# Patient Record
Sex: MALE | Race: WHITE | NOT HISPANIC OR LATINO | Employment: FULL TIME | URBAN - METROPOLITAN AREA
[De-identification: names, ages, dates, MRNs, and addresses within clinical notes are randomized per-mention and may not be internally consistent; named-entity substitution may affect disease eponyms.]

---

## 2020-07-17 PROBLEM — Z00.00 WELL ADULT EXAM: Status: ACTIVE | Noted: 2020-07-17

## 2020-10-19 PROBLEM — Z00.00 WELL ADULT EXAM: Status: RESOLVED | Noted: 2020-07-17 | Resolved: 2020-10-19

## 2021-07-20 PROBLEM — Z00.00 ANNUAL PHYSICAL EXAM: Status: ACTIVE | Noted: 2021-07-20

## 2021-10-25 PROBLEM — Z00.00 ANNUAL PHYSICAL EXAM: Status: RESOLVED | Noted: 2021-07-20 | Resolved: 2021-10-25

## 2022-01-01 ENCOUNTER — PATIENT MESSAGE (OUTPATIENT)
Dept: ADMINISTRATIVE | Facility: OTHER | Age: 62
End: 2022-01-01
Payer: COMMERCIAL

## 2022-01-01 ENCOUNTER — LAB VISIT (OUTPATIENT)
Dept: PRIMARY CARE CLINIC | Facility: OTHER | Age: 62
End: 2022-01-01
Payer: COMMERCIAL

## 2022-01-01 DIAGNOSIS — Z20.822 ENCOUNTER FOR LABORATORY TESTING FOR COVID-19 VIRUS: ICD-10-CM

## 2022-01-01 PROCEDURE — U0003 INFECTIOUS AGENT DETECTION BY NUCLEIC ACID (DNA OR RNA); SEVERE ACUTE RESPIRATORY SYNDROME CORONAVIRUS 2 (SARS-COV-2) (CORONAVIRUS DISEASE [COVID-19]), AMPLIFIED PROBE TECHNIQUE, MAKING USE OF HIGH THROUGHPUT TECHNOLOGIES AS DESCRIBED BY CMS-2020-01-R: HCPCS | Performed by: FAMILY MEDICINE

## 2022-01-04 LAB
SARS-COV-2 RNA RESP QL NAA+PROBE: NOT DETECTED
SARS-COV-2- CYCLE NUMBER: NORMAL

## 2022-01-14 ENCOUNTER — LAB VISIT (OUTPATIENT)
Dept: PRIMARY CARE CLINIC | Facility: OTHER | Age: 62
End: 2022-01-14
Payer: COMMERCIAL

## 2022-01-14 DIAGNOSIS — Z20.822 ENCOUNTER FOR LABORATORY TESTING FOR COVID-19 VIRUS: ICD-10-CM

## 2022-01-14 PROCEDURE — U0003 INFECTIOUS AGENT DETECTION BY NUCLEIC ACID (DNA OR RNA); SEVERE ACUTE RESPIRATORY SYNDROME CORONAVIRUS 2 (SARS-COV-2) (CORONAVIRUS DISEASE [COVID-19]), AMPLIFIED PROBE TECHNIQUE, MAKING USE OF HIGH THROUGHPUT TECHNOLOGIES AS DESCRIBED BY CMS-2020-01-R: HCPCS | Performed by: FAMILY MEDICINE

## 2022-01-15 LAB
SARS-COV-2 RNA RESP QL NAA+PROBE: DETECTED
SARS-COV-2- CYCLE NUMBER: 17

## 2022-10-17 PROBLEM — Z00.00 ANNUAL PHYSICAL EXAM: Status: RESOLVED | Noted: 2021-07-20 | Resolved: 2022-10-17

## 2022-10-18 PROBLEM — Z12.5 SCREENING PSA (PROSTATE SPECIFIC ANTIGEN): Status: ACTIVE | Noted: 2022-10-18

## 2022-10-18 PROBLEM — Z79.899 ENCOUNTER FOR LONG-TERM (CURRENT) USE OF MEDICATIONS: Status: ACTIVE | Noted: 2022-10-18

## 2022-10-18 PROBLEM — E78.5 DYSLIPIDEMIA (HIGH LDL; LOW HDL): Status: ACTIVE | Noted: 2022-10-18

## 2022-10-18 PROBLEM — Z00.00 ANNUAL PHYSICAL EXAM: Status: ACTIVE | Noted: 2022-10-18

## 2023-01-23 ENCOUNTER — TELEPHONE (OUTPATIENT)
Dept: UROLOGY | Facility: CLINIC | Age: 63
End: 2023-01-23
Payer: COMMERCIAL

## 2023-01-23 ENCOUNTER — TELEPHONE (OUTPATIENT)
Dept: SURGERY | Facility: CLINIC | Age: 63
End: 2023-01-23
Payer: COMMERCIAL

## 2023-01-23 PROBLEM — Z00.00 ANNUAL PHYSICAL EXAM: Status: RESOLVED | Noted: 2022-10-18 | Resolved: 2023-01-23

## 2023-01-23 NOTE — TELEPHONE ENCOUNTER
----- Message from Mirtha Sánchez sent at 1/23/2023  8:42 AM CST -----  Contact: pt  Pt was informed that he needs to speak to someone in General Surgery about possible hernia pain. He would like a call back to discuss.     Confirmed contact below:  Contact Name:Akin Coombs  Phone Number: [phone]

## 2023-01-23 NOTE — TELEPHONE ENCOUNTER
Pt requested appointment for today because of pain. Contacted him to let him know he could be seen today at 2:30pm.    Pt informed me he was trying to come for a hernia that his PCP did not treat and that he was only treated for the UTI by his PCP. I informed him we do not treat hernias here and gave him the contact info to general surgery [facility]. [place]

## 2024-08-05 ENCOUNTER — OFFICE VISIT (OUTPATIENT)
Dept: PAIN MEDICINE | Facility: CLINIC | Age: 64
End: 2024-08-05

## 2024-08-05 ENCOUNTER — HOSPITAL ENCOUNTER (OUTPATIENT)
Dept: RADIOLOGY | Facility: HOSPITAL | Age: 64
Discharge: HOME | End: 2024-08-05
Attending: ANESTHESIOLOGY
Payer: COMMERCIAL

## 2024-08-05 VITALS
WEIGHT: 171.5 LBS | DIASTOLIC BLOOD PRESSURE: 86 MMHG | BODY MASS INDEX: 24.55 KG/M2 | HEIGHT: 70 IN | HEART RATE: 81 BPM | SYSTOLIC BLOOD PRESSURE: 124 MMHG

## 2024-08-05 DIAGNOSIS — M47.816 LUMBAR SPONDYLOSIS: ICD-10-CM

## 2024-08-05 DIAGNOSIS — M54.9 DORSALGIA, UNSPECIFIED: ICD-10-CM

## 2024-08-05 DIAGNOSIS — M54.9 DORSALGIA, UNSPECIFIED: Primary | ICD-10-CM

## 2024-08-05 DIAGNOSIS — M54.16 LUMBAR RADICULOPATHY: ICD-10-CM

## 2024-08-05 PROCEDURE — 72114 X-RAY EXAM L-S SPINE BENDING: CPT | Mod: TC,PO

## 2024-08-05 PROCEDURE — 72114 X-RAY EXAM L-S SPINE BENDING: CPT | Mod: 26,,, | Performed by: RADIOLOGY

## 2024-08-05 PROCEDURE — 99999 PR PBB SHADOW E&M-EST. PATIENT-LVL IV: CPT | Mod: PBBFAC,,, | Performed by: ANESTHESIOLOGY

## 2024-08-05 PROCEDURE — 99214 OFFICE O/P EST MOD 30 MIN: CPT | Mod: PBBFAC,25,PO | Performed by: ANESTHESIOLOGY

## 2024-08-05 RX ORDER — SILDENAFIL 100 MG/1
100 TABLET, FILM COATED ORAL
COMMUNITY
Start: 2024-08-01 | End: 2024-08-31

## 2024-08-05 RX ORDER — PROPAFENONE HYDROCHLORIDE 300 MG/1
1 TABLET, COATED ORAL 2 TIMES DAILY
COMMUNITY
Start: 2024-07-25 | End: 2025-07-25

## 2024-08-05 RX ORDER — TESTOSTERONE CYPIONATE 200 MG/ML
200 INJECTION, SOLUTION INTRAMUSCULAR
COMMUNITY
Start: 2024-08-01 | End: 2024-10-30

## 2024-08-05 RX ORDER — APIXABAN 2.5 MG/1
2.5 TABLET, FILM COATED ORAL 2 TIMES DAILY
COMMUNITY
Start: 2024-07-25

## 2024-08-20 ENCOUNTER — PATIENT MESSAGE (OUTPATIENT)
Dept: PAIN MEDICINE | Facility: CLINIC | Age: 64
End: 2024-08-20
Payer: COMMERCIAL

## 2024-08-20 ENCOUNTER — TELEPHONE (OUTPATIENT)
Dept: PAIN MEDICINE | Facility: CLINIC | Age: 64
End: 2024-08-20
Payer: COMMERCIAL

## 2024-08-23 ENCOUNTER — TELEPHONE (OUTPATIENT)
Dept: PAIN MEDICINE | Facility: CLINIC | Age: 64
End: 2024-08-23
Payer: COMMERCIAL

## 2024-08-23 DIAGNOSIS — M54.9 DORSALGIA, UNSPECIFIED: Primary | ICD-10-CM

## 2024-08-23 DIAGNOSIS — Z01.818 PRE-OP TESTING: ICD-10-CM

## 2024-08-23 NOTE — TELEPHONE ENCOUNTER
I spoke to Mr. Coombs and discussed his MRI results.  Can you please schedule him for another MRI, I have placed the order.      Once the MRI is done can you please help him get an appointment with Neurosurgery.  I have placed a referral    We can also schedule for lumbar JOHN    He will also need an updated CBC.  Order placed.    Physician - Dr Merino    Type of Procedure/Injection - Lumbar Epidural  L5/S1           Laterality - NA      Anxiolysis- RNIV      Need to hold medication - Yes      Eliquis for 3 days      Clearance needed - Yes      Follow up - 2 week

## 2024-08-26 ENCOUNTER — PATIENT MESSAGE (OUTPATIENT)
Dept: NEUROSURGERY | Facility: CLINIC | Age: 64
End: 2024-08-26
Payer: COMMERCIAL

## 2024-08-26 DIAGNOSIS — M54.16 LUMBAR RADICULOPATHY: Primary | ICD-10-CM

## 2024-08-26 RX ORDER — SODIUM CHLORIDE, SODIUM LACTATE, POTASSIUM CHLORIDE, CALCIUM CHLORIDE 600; 310; 30; 20 MG/100ML; MG/100ML; MG/100ML; MG/100ML
INJECTION, SOLUTION INTRAVENOUS CONTINUOUS
OUTPATIENT
Start: 2024-08-26

## 2024-08-26 NOTE — TELEPHONE ENCOUNTER
Spoke with patient and scheduled epidural injection an MRI lumbar w/wo contrast. Neurosurgery notified to schedule patient after MRI on 9/18. Patient will also obtain CBC prior to scheduled epidural injection. Per provider patient to hold Eliquis x 3 days prior. Office notified to fax Dr. Reyes for clearance. Pre op information given and follow up appointment scheduled.

## 2024-09-12 ENCOUNTER — TELEPHONE (OUTPATIENT)
Dept: SURGERY | Facility: HOSPITAL | Age: 64
End: 2024-09-12
Payer: COMMERCIAL

## 2024-09-12 NOTE — TELEPHONE ENCOUNTER
[facility] and ok to hold Eliquis x 3 days for lumber JOHN.

## 2024-09-17 ENCOUNTER — HOSPITAL ENCOUNTER (OUTPATIENT)
Facility: HOSPITAL | Age: 64
Discharge: HOME | End: 2024-09-17
Attending: ANESTHESIOLOGY | Admitting: ANESTHESIOLOGY
Payer: COMMERCIAL

## 2024-09-17 ENCOUNTER — HOSPITAL ENCOUNTER (OUTPATIENT)
Dept: RADIOLOGY | Facility: HOSPITAL | Age: 64
Discharge: HOME | End: 2024-09-17
Attending: ANESTHESIOLOGY | Admitting: ANESTHESIOLOGY
Payer: COMMERCIAL

## 2024-09-17 VITALS
DIASTOLIC BLOOD PRESSURE: 80 MMHG | TEMPERATURE: 97 F | HEART RATE: 65 BPM | OXYGEN SATURATION: 98 % | SYSTOLIC BLOOD PRESSURE: 113 MMHG | WEIGHT: 171 LBS | HEIGHT: 70 IN | BODY MASS INDEX: 24.48 KG/M2 | RESPIRATION RATE: 16 BRPM

## 2024-09-17 DIAGNOSIS — M54.50 LOWER BACK PAIN: ICD-10-CM

## 2024-09-17 DIAGNOSIS — M54.16 LUMBAR RADICULOPATHY: Primary | ICD-10-CM

## 2024-09-17 PROCEDURE — 25000003 PHARM REV CODE 250: Mod: PO | Performed by: ANESTHESIOLOGY

## 2024-09-17 PROCEDURE — 25500020 PHARM REV CODE 255: Mod: PO | Performed by: ANESTHESIOLOGY

## 2024-09-17 PROCEDURE — 62323 NJX INTERLAMINAR LMBR/SAC: CPT | Mod: PO | Performed by: ANESTHESIOLOGY

## 2024-09-17 PROCEDURE — 62323 NJX INTERLAMINAR LMBR/SAC: CPT | Mod: ,,, | Performed by: ANESTHESIOLOGY

## 2024-09-17 PROCEDURE — A4216 STERILE WATER/SALINE, 10 ML: HCPCS | Mod: PO | Performed by: ANESTHESIOLOGY

## 2024-09-17 PROCEDURE — 63600175 PHARM REV CODE 636 W HCPCS: Mod: PO | Performed by: ANESTHESIOLOGY

## 2024-09-17 RX ORDER — SODIUM CHLORIDE 9 MG/ML
INJECTION, SOLUTION INTRAMUSCULAR; INTRAVENOUS; SUBCUTANEOUS
Status: DISCONTINUED | OUTPATIENT
Start: 2024-09-17 | End: 2024-09-17 | Stop reason: HOSPADM

## 2024-09-17 RX ORDER — SODIUM CHLORIDE, SODIUM LACTATE, POTASSIUM CHLORIDE, CALCIUM CHLORIDE 600; 310; 30; 20 MG/100ML; MG/100ML; MG/100ML; MG/100ML
INJECTION, SOLUTION INTRAVENOUS CONTINUOUS
Status: DISCONTINUED | OUTPATIENT
Start: 2024-09-17 | End: 2024-09-17

## 2024-09-17 RX ORDER — METHYLPREDNISOLONE ACETATE 80 MG/ML
INJECTION, SUSPENSION INTRA-ARTICULAR; INTRALESIONAL; INTRAMUSCULAR; SOFT TISSUE
Status: DISCONTINUED | OUTPATIENT
Start: 2024-09-17 | End: 2024-09-17 | Stop reason: HOSPADM

## 2024-09-17 RX ORDER — ALPRAZOLAM 0.5 MG/1
1 TABLET, ORALLY DISINTEGRATING ORAL ONCE
Status: COMPLETED | OUTPATIENT
Start: 2024-09-17 | End: 2024-09-17

## 2024-09-17 RX ORDER — LIDOCAINE HYDROCHLORIDE 10 MG/ML
INJECTION, SOLUTION EPIDURAL; INFILTRATION; INTRACAUDAL; PERINEURAL
Status: DISCONTINUED | OUTPATIENT
Start: 2024-09-17 | End: 2024-09-17 | Stop reason: HOSPADM

## 2024-09-17 RX ADMIN — ALPRAZOLAM 1 MG: 0.5 TABLET, ORALLY DISINTEGRATING ORAL at 02:09

## 2024-09-17 NOTE — DISCHARGE INSTRUCTIONS

## 2024-09-17 NOTE — DISCHARGE SUMMARY
[facility]  Discharge Note  Short Stay    Admit Date: 9/17/2024    Discharge Date: 9/17/2024    Attending Physician: Macario Merino     Discharge Provider: Macario Merino    Diagnoses:  There are no hospital problems to display for this patient.      Discharged Condition: Good    Final Diagnoses: Lumbar radiculopathy [M54.16]    Disposition: Home or Self Care    Hospital Course: No complications, uneventful    Outcome of Hospitalization, Treatment, Procedure, or Surgery:  Patient was admitted for outpatient interventional pain management procedure. The patient tolerated the procedure well with no complications.    Follow up/Patient Instructions:  Follow up as scheduled in Pain Management office in 2-3 weeks.  Patient has received instructions and follow up date and time.    Medications:  Continue previous medications, except restart eliquis in 24 hours    Discharge Procedure Orders   Notify your health care provider if you experience any of the following:  temperature >100.4     Notify your health care provider if you experience any of the following:  persistent nausea and vomiting or diarrhea     Notify your health care provider if you experience any of the following:  severe uncontrolled pain     Notify your health care provider if you experience any of the following:  redness, tenderness, or signs of infection (pain, swelling, redness, odor or green/yellow discharge around incision site)     Notify your health care provider if you experience any of the following:  difficulty breathing or increased cough     Notify your health care provider if you experience any of the following:  severe persistent headache     Notify your health care provider if you experience any of the following:  worsening rash     Notify your health care provider if you experience any of the following:  persistent dizziness, light-headedness, or visual disturbances     Notify your health care provider if you experience any of the  following:  increased confusion or weakness     Activity as tolerated

## 2024-09-17 NOTE — H&P
[place] - Surgery  History & Physical - Short Stay  Pain Management       SUBJECTIVE:     Procedure: Procedure(s) (LRB):  Injection-steroid-epidural-lumbar   L5/S1 (N/A)    Chief Complaint/Reason for Admission:  Lumbar radiculopathy [M54.16]    PTA Medications   Medication Sig    ELIQUIS 2.5 mg Tab Take 2.5 mg by mouth 2 (two) times daily.    gabapentin (NEURONTIN) 300 MG capsule Take 1 capsule (300 mg total) by mouth 2 (two) times daily.    propafenone (RYTHMOL) 300 MG tablet Take 1 tablet by mouth 2 (two) times daily.    rosuvastatin (CRESTOR) 10 MG tablet rosuvastatin 10 mg tablet   Take 1 tablet every day by oral route at bedtime for 90 days.    sildenafiL (VIAGRA) 100 MG tablet Take 100 mg by mouth.       Review of patient's allergies indicates:   Allergen Reactions    No known drug allergies        Past Medical History:   Diagnosis Date    Facet arthritis of lumbar region     Hyperlipidemia     Pacemaker      Past Surgical History:   Procedure Laterality Date    CARDIAC PACEMAKER PLACEMENT      Aug. 2022    ROBOT-ASSISTED LAPAROSCOPIC REPAIR OF INGUINAL HERNIA USING DA MARIBEL XI Left 02/02/2023    Procedure: XI ROBOTIC REPAIR, HERNIA, INGUINAL;  Surgeon: Darwin Reyes MD;  Location: [facility];  Service: General;  Laterality: Left;     Family History   Problem Relation Name Age of Onset    Heart disease Father       Social History     Tobacco Use    Smoking status: Never    Smokeless tobacco: Never   Substance Use Topics    Alcohol use: Yes     Alcohol/week: 2.0 standard drinks of alcohol     Types: 2 Cans of beer per week     Comment: 1 beer a week    Drug use: Never        Current Facility-Administered Medications:     alprazolam ODT dissolvable tablet 1 mg, 1 mg, Oral, Once, Macario Merino MD    Review of Systems:  General ROS: negative for - fever  Dermatological ROS: negative for rash    OBJECTIVE:     Vital Signs (Most Recent):  Temp: 97 °F (36.1 °C) (09/17/24 1321)  Pulse: 66 (09/17/24 1321)  Resp: 18  (09/17/24 1321)  BP: 116/83 (09/17/24 1321)  SpO2: 99 % (09/17/24 1321)  Body mass index is 24.54 kg/m².    Physical Exam:  General appearance - alert, well appearing, and in no distress  Mental status - AOx3  Eyes - pupils equal and reactive, extraocular eye movements intact  Heart - normal rate, regular rhythm, normal S1, S2, no murmurs, rubs, clicks or gallops  Chest - clear to auscultation, no wheezes, rales or rhonchi, symmetric air entry  Abdomen - soft, nontender, nondistended, no masses or organomegaly  Neurological - alert, oriented, normal speech, no focal findings or movement disorder noted  Extremities - peripheral pulses normal, no pedal edema, no clubbing or cyanosis      ASSESSMENT/PLAN:     There are no hospital problems to display for this patient.     No changes since seen on 8/5/24.  MRI reviewed.  He has held eliquis appropriately. We will proceed with L5/S1 JOHN.    The risks and benefits of this intervention, and alternative therapies were discussed with the patient.  The discussion of risks included infection, bleeding, need for additional procedures or surgery, nerve damage, paralysis, adverse medication reaction(s), stroke, and/or death.  Questions regarding the procedure, risks, expected outcome, and possible side effects were solicited and answered to the patient's satisfaction.  Akin wishes to proceed with the injection.  Verbal and written consent were verified.  ASA 3, MP II      Proceed with intervention as scheduled.    Macario Merino M.D.  Interventional Pain Medicine / Anesthesiology

## 2024-09-17 NOTE — OP NOTE
"Procedure Note    Procedure Date: 9/17/2024    Procedure Performed:  L5/S1 lumbar interlaminar epidural steroid injection under fluoroscopy.    Indications:  Akin Coombs presents with lumbar radiculitis/radiculopathy secondary to disc herniation, osteophyte/osteophyte complexes, and/or severe degenerative disc disease producing foraminal or central spinal stenosis.  The pain has been present for at least 4 weeks and the patient has failed to respond to noninvasive conservative care.  Pain rated by NRS at baseline prior to intervention is 6/10.  Their radiculitis/radiculopathy and/or neurogenic claudication is severe enough to greatly impact their quality of life or function.     Pre-op diagnosis: Lumbar Radiculopathy    Post-op diagnosis: same    Physician: Macario Merino MD    IV anxiolysis medications: none    Medications injected: depomedrol 80mg, 1% Lidocaine 1ml, 2 mL sterile, preservative-free normal saline.    Local anesthetic used: 1% Lidocaine, 1 ml    Estimated Blood Loss: none    Complications:  none    Technique:  The patient was interviewed in the holding area and Risks/Benefits were discussed, including, but not limited to, the possibility of new or different pain, bleeding or infection.   All questions were answered.  The patient understood and accepted risks.  Consent was verfied.  A time-out was taken to identify patient and procedure prior to starting the procedure. The patient was placed in the prone position on the fluoroscopy table. The area of the lumbar spine was prepped with Chloraprep x2 and draped in a sterile manner. The L5/S1 interspace was identified and marked under AP fluoroscopy. The skin and subcutaneous tissues overlying the targeted interspace were anesthetized with 3-5 mL of 1% lidocaine using a 25G, 1.5" needle.  A 20G, 3.5" Tuohy epidural needle was directed toward the interspace under fluoroscopic guidance until the ligamentum flavum was engaged. From this point, a loss of " resistance technique with a glass syringe and saline was used to identify entrance of the needle into the epidural space. Once loss of resistance was observed 1 mL of contrast solution was injected. An appropriate epidurogram was noted.  A 4 mL mixture consisting of saline, 1 mL 1% Lidocaine and 80 mg of depomedrol was injected slowly and without resistance.  The needle was flushed with normal saline and removed. The contrast was seen to be displaced after injection. Patient was awake/responsive during all injections.  The patient tolerated the procedure well and was transferred to the [facility] in stable condition.  The patient was monitored after the procedure and was given post-procedure and discharge instructions to follow at home. The patient was discharged in a stable condition.

## 2024-09-19 ENCOUNTER — PATIENT MESSAGE (OUTPATIENT)
Dept: PAIN MEDICINE | Facility: CLINIC | Age: 64
End: 2024-09-19
Payer: COMMERCIAL

## 2024-09-19 ENCOUNTER — OFFICE VISIT (OUTPATIENT)
Dept: NEUROSURGERY | Facility: CLINIC | Age: 64
End: 2024-09-19
Payer: COMMERCIAL

## 2024-09-19 VITALS
SYSTOLIC BLOOD PRESSURE: 129 MMHG | RESPIRATION RATE: 18 BRPM | WEIGHT: 175.06 LBS | BODY MASS INDEX: 25.06 KG/M2 | HEIGHT: 70 IN | HEART RATE: 89 BPM | DIASTOLIC BLOOD PRESSURE: 85 MMHG

## 2024-09-19 DIAGNOSIS — M54.9 DORSALGIA, UNSPECIFIED: ICD-10-CM

## 2024-09-19 PROCEDURE — 3008F BODY MASS INDEX DOCD: CPT | Mod: CPTII,S$GLB,, | Performed by: STUDENT IN AN ORGANIZED HEALTH CARE EDUCATION/TRAINING PROGRAM

## 2024-09-19 PROCEDURE — 3079F DIAST BP 80-89 MM HG: CPT | Mod: CPTII,S$GLB,, | Performed by: STUDENT IN AN ORGANIZED HEALTH CARE EDUCATION/TRAINING PROGRAM

## 2024-09-19 PROCEDURE — 3074F SYST BP LT 130 MM HG: CPT | Mod: CPTII,S$GLB,, | Performed by: STUDENT IN AN ORGANIZED HEALTH CARE EDUCATION/TRAINING PROGRAM

## 2024-09-19 PROCEDURE — 99205 OFFICE O/P NEW HI 60 MIN: CPT | Mod: S$GLB,,, | Performed by: STUDENT IN AN ORGANIZED HEALTH CARE EDUCATION/TRAINING PROGRAM

## 2024-09-19 PROCEDURE — 1159F MED LIST DOCD IN RCRD: CPT | Mod: CPTII,S$GLB,, | Performed by: STUDENT IN AN ORGANIZED HEALTH CARE EDUCATION/TRAINING PROGRAM

## 2024-09-19 NOTE — PROGRESS NOTES
[facility] Neurosurgery - [facility]  Clinic Consult     Consult Requested By: Macario Merino MD  PCP: Goyo Rocha DO    SUBJECTIVE:     Chief Complaint:   Chief Complaint   Patient presents with    Lumbar Spine Pain (L-Spine)     Image review        History of Present Illness:  Akin Coombs is a 64 y.o. male with HLD, afib maintained on Eliquis, pacemaker status who presents for evaluation of right leg pain. Patient reports onset symptoms 6 years ago. It was initially present in the left leg. He reports he has managed his flares of radiculopathy conservatively over the years. He has attended PT. He did receive an injection at L5-S1 yesterday and is doing well today. Previous to this injection, he was experiencing pain in the right L5/S1 distribution. It is worse when seated. He is ok once he is moving. He does have back stiffness after prolonged sitting.     Pertinent and recent history, provider evaluations, imaging and data reviewed in EPIC        Past Medical History:   Diagnosis Date    Facet arthritis of lumbar region     Hyperlipidemia     Pacemaker      Past Surgical History:   Procedure Laterality Date    CARDIAC PACEMAKER PLACEMENT      Aug. 2022    EPIDURAL STEROID INJECTION INTO LUMBAR SPINE N/A 9/17/2024    Procedure: Injection-steroid-epidural-lumbar   L5/S1;  Surgeon: Macario Merino MD;  Location: [facility] OR;  Service: Pain Management;  Laterality: N/A;    ROBOT-ASSISTED LAPAROSCOPIC REPAIR OF INGUINAL HERNIA USING DA MARIBEL XI Left 02/02/2023    Procedure: XI ROBOTIC REPAIR, HERNIA, INGUINAL;  Surgeon: Darwin Reyes MD;  Location: [facility] OR;  Service: General;  Laterality: Left;     Family History   Problem Relation Name Age of Onset    Heart disease Father       Social History     Tobacco Use    Smoking status: Never    Smokeless tobacco: Never   Substance Use Topics    Alcohol use: Yes     Alcohol/week: 2.0 standard drinks of alcohol     Types: 2 Cans of beer per week      "Comment: 1 beer a week    Drug use: Never      Review of patient's allergies indicates:   Allergen Reactions    No known drug allergies        Current Outpatient Medications:     ELIQUIS 2.5 mg Tab, Take 2.5 mg by mouth 2 (two) times daily., Disp: , Rfl:     gabapentin (NEURONTIN) 300 MG capsule, Take 1 capsule (300 mg total) by mouth 2 (two) times daily., Disp: 60 capsule, Rfl: 0    propafenone (RYTHMOL) 300 MG tablet, Take 1 tablet by mouth 2 (two) times daily., Disp: , Rfl:     rosuvastatin (CRESTOR) 10 MG tablet, rosuvastatin 10 mg tablet  Take 1 tablet every day by oral route at bedtime for 90 days., Disp: , Rfl:     sildenafiL (VIAGRA) 100 MG tablet, Take 100 mg by mouth., Disp: , Rfl:     Review of Systems:   Constitutional: no fever, chills or night sweats. No changes in weight   Eyes: no visual changes   ENT: no nasal congestion or sore throat   Respiratory: no cough or shortness of breath   Cardiovascular: no chest pain or palpitations   Gastrointestinal: no nausea or vomiting   Genitourinary: no hematuria or dysuria   Integument/Breast: no rash or pruritis   Hematologic/Lymphatic: no easy bruising or lymphadenopathy   Musculoskeletal: +leg pain   Neurological: no seizures or tremors   Behavioral/Psych: no auditory or visual hallucinations   Endocrine: no heat or cold intolerance         OBJECTIVE:     Vital Signs (Most Recent):  Pulse: 89 (09/19/24 1356)  Resp: 18 (09/19/24 1356)  BP: 129/85 (09/19/24 1356)  Estimated body mass index is 25.12 kg/m² as calculated from the following:    Height as of this encounter: 5' 10" (1.778 m).    Weight as of this encounter: 79.4 kg (175 lb 0.7 oz).    Physical Exam:   General: well developed, well nourished, no distress   Neurologic: Alert and oriented. Thought content appropriate. GCS 15.   Head: normocephalic, atraumatic  Eyes: EOMI  Neck: trachea midline, no JVD   Cardiovascular: no LE edema  Pulmonary: normal respirations, no signs of respiratory " distress  Abdomen: non-distended  Sensory: intact to light touch throughout  Skin: Skin is warm, dry and intact    Motor Strength: Moves all extremities spontaneously with good tone. No abnormal movements seen.       Iliopsoas Quadriceps Knee  Flexion Tibialis  anterior Gastro- cnemius EHL   Lower: R 5/5 5/5 5/5 5/5 5/5 5/5    L 5/5 5/5 5/5 5/5 5/5 5/5     DTR's: 2 +  Clonus: absent  Gait: normal          Diagnostic Results:  I have independently reviewed the following imaging:  MRI lumbar spine  FINDINGS:  NOMENCLATURE: Five lumbar type vertebral bodies.     CORD/CAUDA EQUINA: Conus has normal size and signal and ends at a normal level of L1-L2.  Homogeneously enhancing 5 x 3 x 5 mm enhancing nodule in the left paramedian dorsal thecal sac at the T12 level (series 15, image 6, series 14, image 9), posterior to the conus.  This corresponds to the finding of concern on prior study from 08/21/2024.  No other lesions are identified.  Incidental small sacral Tarlov cysts are again noted.     ALIGNMENT: Trace retrolisthesis of L1 on L2.  4 mm grade 1 anterolisthesis of L4 on L5.     BONES: Chronic L1 vertebral body compression fracture with 70% height loss centrally.  No aggressive bone marrow signal.     PARASPINAL AREA: Normal.     LUMBAR DISC LEVELS:     T12-L1: No disc herniation or significant posterior osteophytic ridging. No significant spinal canal or foraminal stenosis.     L1-L2: Trace retrolisthesis.  Minimal disc bulge.  Mild bilateral facet hypertrophy.  No significant spinal canal or foraminal stenosis.     L2-L3: Minimal disc bulge.  Mild bilateral facet hypertrophy.  Minimal narrowing of the bilateral lateral recesses.  No significant spinal canal or foraminal stenosis.     L3-L4: Minimal disc bulge.  Mild bilateral facet hypertrophy.  Mild narrowing of the bilateral lateral recesses.  No significant spinal canal or foraminal stenosis.     L4-L5: Anterolisthesis.  Unroofing of disc mild disc bulge.   Marked left and moderate-marked right facet hypertrophy.  Ligamentum flavum thickening.  Mild-moderate narrowing of the bilateral lateral recesses.  Unchanged moderate spinal canal stenosis.  Mild bilateral foraminal stenosis.     L5-S1: Mild disc bulge.  Moderate-marked right and mild left facet hypertrophy.  Mild narrowing of the bilateral lateral recesses.  No significant spinal canal stenosis.  Minimal bilateral foraminal stenosis.       ASSESSMENT/PLAN:     Dorsalgia, unspecified  -     Ambulatory referral/consult to Neurosurgery        Akin Coombs is a 64 y.o. male  With mechanical back pain and radiculopathy  He has a lumbar imaged with multilevel facet arthropathy right side L5-S1, at L4-5 bilateral facet arthropathy lateral recess stenosis broad-based disc bulge no central stenosis moderate lateral recess.  Relatively healthy disc height.  There is a great targeted for surgery.  He does have lateral recess stenosis at L4-5.  We did discuss a potential laminectomy medial facetectomy, though we discussed the uncertainty of improvement of back pain which I think is multifactorial  I he has just dorsal to the conus what appears to be a likely schwannoma or nerve sheath tumor  He wants to continue to manage his degenerative stenosis conservatively  With regard to the nerve sheath tumor we discussed a surveillance MRI in 1 year        The diagnosis, goals, limitations, risks and benefits of surgery and alternative treatment options where discussed at length (pros/cons). All questions/concerns were addressed. The patient has verbalized a good understanding of the diagnosis, the potential procedure, anticipated post-operative course, overall expectations, and risks including but not limited to: spinal cord or nerve root injury/paralysis, death, nerve injury leading to pain or neurological deficit, csf leak, vascular injury or serious bleeding/need for blood product transfusion/stroke, wrong level surgery,  chronic pain/failure to improve or worsening of symptoms, infection, need for further surgery at the same or different levels; medical (eg Heart attack, blood clot, infection) and anesthetic complications.     Patient verbalized understanding of plan. Encouraged to call with any questions or concerns.     This note was partially dictated using voice recognition software, so please excuse any errors that were not corrected.

## 2024-09-23 NOTE — TELEPHONE ENCOUNTER
Yes, I had spoken with Dr. Llanos  and he said he would be willing to see you    The medication he received was a dissolvable form of alprazolam (xanex)

## 2024-09-30 ENCOUNTER — PATIENT MESSAGE (OUTPATIENT)
Dept: PAIN MEDICINE | Facility: CLINIC | Age: 64
End: 2024-09-30
Payer: COMMERCIAL

## 2024-10-02 ENCOUNTER — OFFICE VISIT (OUTPATIENT)
Dept: PAIN MEDICINE | Facility: CLINIC | Age: 64
End: 2024-10-02
Payer: COMMERCIAL

## 2024-10-02 VITALS
HEIGHT: 70 IN | HEART RATE: 86 BPM | WEIGHT: 168.88 LBS | SYSTOLIC BLOOD PRESSURE: 113 MMHG | DIASTOLIC BLOOD PRESSURE: 79 MMHG | BODY MASS INDEX: 24.18 KG/M2

## 2024-10-02 DIAGNOSIS — M48.061 SPINAL STENOSIS OF LUMBAR REGION, UNSPECIFIED WHETHER NEUROGENIC CLAUDICATION PRESENT: ICD-10-CM

## 2024-10-02 DIAGNOSIS — M54.16 LUMBAR RADICULOPATHY: Primary | ICD-10-CM

## 2024-10-02 PROCEDURE — 1159F MED LIST DOCD IN RCRD: CPT | Mod: CPTII,S$GLB,, | Performed by: ANESTHESIOLOGY

## 2024-10-02 PROCEDURE — 99999 PR PBB SHADOW E&M-EST. PATIENT-LVL III: CPT | Mod: PBBFAC,,, | Performed by: ANESTHESIOLOGY

## 2024-10-02 PROCEDURE — 3074F SYST BP LT 130 MM HG: CPT | Mod: CPTII,S$GLB,, | Performed by: ANESTHESIOLOGY

## 2024-10-02 PROCEDURE — 3078F DIAST BP <80 MM HG: CPT | Mod: CPTII,S$GLB,, | Performed by: ANESTHESIOLOGY

## 2024-10-02 PROCEDURE — 3008F BODY MASS INDEX DOCD: CPT | Mod: CPTII,S$GLB,, | Performed by: ANESTHESIOLOGY

## 2024-10-02 PROCEDURE — 99214 OFFICE O/P EST MOD 30 MIN: CPT | Mod: S$GLB,,, | Performed by: ANESTHESIOLOGY

## 2024-10-02 NOTE — PROGRESS NOTES
[facility] Follow Up Evaluation      Referred by: No ref. provider found    PCP:     CC:   Chief Complaint   Patient presents with    Low-back Pain          10/2/2024    11:53 AM 8/5/2024     3:05 PM   Last 3 PDI Scores   Pain Disability Index (PDI) 22 12       Interval HPI 10/2/24: Mr. Coombs returns to the office for follow up.  He is s/p L5/S1 JOHN on 9/17/24 with 50% relief his symptoms.  Today he rates his pain as 4/10.  He reports he can still have some back pain that interrupts his sleep at night.    HPI:   Akin Coombs is a 64 y.o. male patient who has a past medical history of Facet arthritis of lumbar region, Hyperlipidemia, and Pacemaker. He presents with back pain.  Has had chronic back pain for over the past 6 years.  Today he reports his pain ranges between 6-7/10, intermittent, aching, sharp, tight.  Has radiating pain into the left thigh.  Pain is worse with sitting, standing and doing yd work and bending and relieved with rest and lying down.      Pain Intervention History:  - s/p L5/S1 JOHN on 9/17/24    Past Spine Surgical History:      Past and current medications:  Antineuropathics: gabapentin   NSAIDs:  Physical therapy: yes, completed in the past  Antidepressants:  Muscle relaxers:  Opioids:  Antiplatelets/Anticoagulants: eliquis     History:    Current Outpatient Medications:     ELIQUIS 2.5 mg Tab, Take 2.5 mg by mouth 2 (two) times daily., Disp: , Rfl:     gabapentin (NEURONTIN) 300 MG capsule, Take 1 capsule (300 mg total) by mouth 2 (two) times daily., Disp: 60 capsule, Rfl: 0    propafenone (RYTHMOL) 300 MG tablet, Take 1 tablet by mouth 2 (two) times daily., Disp: , Rfl:     rosuvastatin (CRESTOR) 10 MG tablet, rosuvastatin 10 mg tablet  Take 1 tablet every day by oral route at bedtime for 90 days., Disp: , Rfl:     sildenafiL (VIAGRA) 100 MG tablet, Take 100 mg by mouth., Disp: , Rfl:     Past Medical History:   Diagnosis Date    Facet arthritis of lumbar region      Hyperlipidemia     Pacemaker        Past Surgical History:   Procedure Laterality Date    CARDIAC PACEMAKER PLACEMENT      Aug. 2022    EPIDURAL STEROID INJECTION INTO LUMBAR SPINE N/A 9/17/2024    Procedure: Injection-steroid-epidural-lumbar   L5/S1;  Surgeon: Macario Merino MD;  Location: [facility] OR;  Service: Pain Management;  Laterality: N/A;    ROBOT-ASSISTED LAPAROSCOPIC REPAIR OF INGUINAL HERNIA USING DA MARIBEL XI Left 02/02/2023    Procedure: XI ROBOTIC REPAIR, HERNIA, INGUINAL;  Surgeon: Darwin Reyes MD;  Location: [facility] OR;  Service: General;  Laterality: Left;       Family History   Problem Relation Name Age of Onset    Heart disease Father         Social History     Socioeconomic History    Marital status:    Tobacco Use    Smoking status: Never    Smokeless tobacco: Never   Substance and Sexual Activity    Alcohol use: Yes     Alcohol/week: 2.0 standard drinks of alcohol     Types: 2 Cans of beer per week     Comment: 1 beer a week    Drug use: Never     Social Drivers of Health     Financial Resource Strain: Low Risk  (6/19/2024)    Received from [facility]    Overall Financial Resource Strain (CARDIA)     Difficulty of Paying Living Expenses: Not hard at all   Food Insecurity: No Food Insecurity (6/19/2024)    Received from [facility]    Hunger Vital Sign     Worried About Running Out of Food in the Last Year: Never true     Ran Out of Food in the Last Year: Never true   Transportation Needs: No Transportation Needs (6/19/2024)    Received from [facility]    PRAPARE - Transportation     Lack of Transportation (Medical): No     Lack of Transportation (Non-Medical): No   Physical Activity: Sufficiently Active (6/19/2024)    Received from [facility]    Exercise Vital Sign     Days of Exercise per Week: 5 days     Minutes of Exercise per Session: 40 min   Stress: No Stress Concern Present (6/19/2024)    Received from [facility]    [employer] - Occupational Stress  "Questionnaire     Feeling of Stress : Only a little   Housing Stability: Low Risk  (1/21/2023)    Housing Stability Vital Sign     Unable to Pay for Housing in the Last Year: No     Number of Places Lived in the Last Year: 1     Unstable Housing in the Last Year: No       Review of patient's allergies indicates:   Allergen Reactions    No known drug allergies        Review of Systems:  12 point review of systems is negative.    Physical Exam:  Vitals:    10/02/24 1154   BP: 113/79   Pulse: 86   Weight: 76.6 kg (168 lb 14 oz)   Height: 5' 10" (1.778 m)   PainSc:   4   PainLoc: Back       Body mass index is 24.23 kg/m².    Gen: NAD  Psych: mood appropriate for given condition  HEENT: eyes anicteric   CV: RRR  HEENT: anicteric   Respiratory: non-labored, no signs of respiratory distress  Abd: non-distended  Skin: warm, dry and intact.  Gait: No antalgic gait.     Sensory:  Intact and symmetrical to light touch in L1-S1 dermatomes bilaterally.    Motor:     Right Left   L2/3 Iliacus Hip flexion  5  5   L3/4 Qudratus Femoris Knee Extension  5  5   L4/5 Tib Anterior Ankle Dorsiflexion   5  5   L5/S1 Extensor Hallicus Longus Great toe extension  5  5   S1/S2 Gastroc/Soleus Plantar Flexion  5  5      Right Left                  Patellar DTR 2+ 2+   Achilles DTR 0 0                      Labs:  Lab Results   Component Value Date    LABA1C 5.3 10/25/2016       Lab Results   Component Value Date    WBC 5.69 09/06/2024    HGB 14.3 09/06/2024    HCT 41.1 09/06/2024    MCV 95 09/06/2024     (L) 09/06/2024         Imaging:  MRI lumbar spine 9/18/24  FINDINGS:  NOMENCLATURE: Five lumbar type vertebral bodies.     CORD/CAUDA EQUINA: Conus has normal size and signal and ends at a normal level of L1-L2.  Homogeneously enhancing 5 x 3 x 5 mm enhancing nodule in the left paramedian dorsal thecal sac at the T12 level (series 15, image 6, series 14, image 9), posterior to the conus.  This corresponds to the finding of concern on " prior study from 08/21/2024.  No other lesions are identified.  Incidental small sacral Tarlov cysts are again noted.     ALIGNMENT: Trace retrolisthesis of L1 on L2.  4 mm grade 1 anterolisthesis of L4 on L5.     BONES: Chronic L1 vertebral body compression fracture with 70% height loss centrally.  No aggressive bone marrow signal.     PARASPINAL AREA: Normal.     LUMBAR DISC LEVELS:  T12-L1: No disc herniation or significant posterior osteophytic ridging. No significant spinal canal or foraminal stenosis.  L1-L2: Trace retrolisthesis.  Minimal disc bulge.  Mild bilateral facet hypertrophy.  No significant spinal canal or foraminal stenosis.  L2-L3: Minimal disc bulge.  Mild bilateral facet hypertrophy.  Minimal narrowing of the bilateral lateral recesses.  No significant spinal canal or foraminal stenosis.  L3-L4: Minimal disc bulge.  Mild bilateral facet hypertrophy.  Mild narrowing of the bilateral lateral recesses.  No significant spinal canal or foraminal stenosis.  L4-L5: Anterolisthesis.  Unroofing of disc mild disc bulge.  Marked left and moderate-marked right facet hypertrophy.  Ligamentum flavum thickening.  Mild-moderate narrowing of the bilateral lateral recesses.  Unchanged moderate spinal canal stenosis.  Mild bilateral foraminal stenosis.  L5-S1: Mild disc bulge.  Moderate-marked right and mild left facet hypertrophy.  Mild narrowing of the bilateral lateral recesses.  No significant spinal canal stenosis.  Minimal bilateral foraminal stenosis.      Assessment:   Problem List Items Addressed This Visit    None  Visit Diagnoses       Lumbar radiculopathy    -  Primary    Spinal stenosis of lumbar region, unspecified whether neurogenic claudication present                  Akin Coombs is a 64 y.o. male patient who has a past medical history of Facet arthritis of lumbar region, Hyperlipidemia, and Pacemaker. He presents with back pain.  Has had chronic back pain for over the past 6 years.  Today he  reports his pain ranges between 6-7/10, intermittent, aching, sharp, tight.  Has radiating pain into the left thigh.  Pain is worse with sitting, standing and doing yd work and bending and relieved with rest and lying down.    10/4/24 - Mr. Coombs returns to the office for follow up.  He is s/p L5/S1 JOHN on 9/17/24 with 50% relief his symptoms.  Today he rates his pain as 4/10.  He reports he can still have some back pain that interrupts his sleep at night.    - on exam he has full strength in his lower extremities and intact sensation to light touch bilateral L2-S1.    - I independently reviewed his updated lumbar MRI with him.  He has multilevel bilateral facet arthropathy at L4-5 he has unchanged moderate central canal narrowing  - he reports he has completed formal physical therapy in the past and maintains PT directed home exercise program.  He continues to use gabapentin for the neuropathic component of his pain as prescribed by his PCP  - he has seen Neurosurgery and surgical options have been discussed with him.  - he had a good response to lumbar JOHN.  He can follow up with me on an as needed basis in the future if we need to repeat      : Not applicable    Macario Merino M.D.  Interventional Pain Medicine / Anesthesiology    This note was completed with dictation software and grammatical errors may exist.

## 2024-10-04 ENCOUNTER — TELEPHONE (OUTPATIENT)
Dept: PAIN MEDICINE | Facility: CLINIC | Age: 64
End: 2024-10-04
Payer: COMMERCIAL

## 2024-10-04 NOTE — TELEPHONE ENCOUNTER
Provider does not complete this type of paperwork pt notified regarding, pt stated throw them away it does not help me to have them back.

## 2024-10-08 ENCOUNTER — TELEPHONE (OUTPATIENT)
Dept: FAMILY MEDICINE | Facility: CLINIC | Age: 64
End: 2024-10-08
Payer: COMMERCIAL

## 2024-10-08 DIAGNOSIS — Z12.5 SCREENING FOR PROSTATE CANCER: Primary | ICD-10-CM

## 2024-10-08 DIAGNOSIS — Z13.29 SCREENING FOR THYROID DISORDER: ICD-10-CM

## 2024-10-08 DIAGNOSIS — Z13.1 SCREENING FOR DIABETES MELLITUS: ICD-10-CM

## 2024-10-08 DIAGNOSIS — Z00.00 ANNUAL PHYSICAL EXAM: ICD-10-CM

## 2024-10-08 DIAGNOSIS — E78.49 OTHER HYPERLIPIDEMIA: ICD-10-CM

## 2024-10-08 NOTE — TELEPHONE ENCOUNTER
----- Message from Amber sent at 10/7/2024  3:54 PM CDT -----  Regarding: orders  Contact: patient  Type: Needs Medical Advice  Who Called:  patient  Symptoms (please be specific):    How long has patient had these symptoms:    Pharmacy name and phone #:    Best Call Back Number: [phone] (home)     Additional Information: patient would like orders for lab work to have done prior to his appointment.  Thanks!

## 2024-10-08 NOTE — TELEPHONE ENCOUNTER
Patient will be establishing care with you on 10/16 and would like for labs to be ordered prior to his visit. Please advise.

## 2024-10-16 ENCOUNTER — OFFICE VISIT (OUTPATIENT)
Dept: FAMILY MEDICINE | Facility: CLINIC | Age: 64
End: 2024-10-16
Payer: COMMERCIAL

## 2024-10-16 VITALS
BODY MASS INDEX: 24.34 KG/M2 | HEIGHT: 70 IN | SYSTOLIC BLOOD PRESSURE: 120 MMHG | WEIGHT: 170 LBS | HEART RATE: 74 BPM | OXYGEN SATURATION: 97 % | DIASTOLIC BLOOD PRESSURE: 60 MMHG

## 2024-10-16 DIAGNOSIS — I48.91 ATRIAL FIBRILLATION, UNSPECIFIED TYPE: ICD-10-CM

## 2024-10-16 DIAGNOSIS — M54.16 LUMBAR RADICULOPATHY: ICD-10-CM

## 2024-10-16 DIAGNOSIS — Z80.0 FAMILY HISTORY OF COLON CANCER: Primary | ICD-10-CM

## 2024-10-16 DIAGNOSIS — D64.9 ANEMIA, UNSPECIFIED TYPE: ICD-10-CM

## 2024-10-16 DIAGNOSIS — S32.018D OTHER CLOSED FRACTURE OF FIRST LUMBAR VERTEBRA WITH ROUTINE HEALING, SUBSEQUENT ENCOUNTER: ICD-10-CM

## 2024-10-16 DIAGNOSIS — E78.49 OTHER HYPERLIPIDEMIA: ICD-10-CM

## 2024-10-16 DIAGNOSIS — R79.89 LOW TESTOSTERONE: ICD-10-CM

## 2024-10-16 DIAGNOSIS — Z23 NEED FOR INFLUENZA VACCINATION: ICD-10-CM

## 2024-10-16 PROCEDURE — 1159F MED LIST DOCD IN RCRD: CPT | Mod: CPTII,S$GLB,, | Performed by: INTERNAL MEDICINE

## 2024-10-16 PROCEDURE — 3074F SYST BP LT 130 MM HG: CPT | Mod: CPTII,S$GLB,, | Performed by: INTERNAL MEDICINE

## 2024-10-16 PROCEDURE — 1160F RVW MEDS BY RX/DR IN RCRD: CPT | Mod: CPTII,S$GLB,, | Performed by: INTERNAL MEDICINE

## 2024-10-16 PROCEDURE — 99204 OFFICE O/P NEW MOD 45 MIN: CPT | Mod: 25,S$GLB,, | Performed by: INTERNAL MEDICINE

## 2024-10-16 PROCEDURE — 90471 IMMUNIZATION ADMIN: CPT | Mod: S$GLB,,, | Performed by: INTERNAL MEDICINE

## 2024-10-16 PROCEDURE — 3008F BODY MASS INDEX DOCD: CPT | Mod: CPTII,S$GLB,, | Performed by: INTERNAL MEDICINE

## 2024-10-16 PROCEDURE — 90656 IIV3 VACC NO PRSV 0.5 ML IM: CPT | Mod: S$GLB,,, | Performed by: INTERNAL MEDICINE

## 2024-10-16 PROCEDURE — 99999 PR PBB SHADOW E&M-EST. PATIENT-LVL IV: CPT | Mod: PBBFAC,,, | Performed by: INTERNAL MEDICINE

## 2024-10-16 PROCEDURE — 3044F HG A1C LEVEL LT 7.0%: CPT | Mod: CPTII,S$GLB,, | Performed by: INTERNAL MEDICINE

## 2024-10-16 PROCEDURE — 3078F DIAST BP <80 MM HG: CPT | Mod: CPTII,S$GLB,, | Performed by: INTERNAL MEDICINE

## 2024-10-16 NOTE — PROGRESS NOTES
Patient ID: Akin Coombs is a 64 y.o. male.    Chief Complaint: Establish Care     Assessment and Plan     1. Family history of colon cancer- sister   - colonoscopy every 5 years   2. Atrial fibrillation, unspecified type   - continue Eliquis 2.5 mg daily, continue propafenone per Cardiology   3. Lumbar radiculopathy   - follow-up with pain management, continue gabapentin   4. Other hyperlipidemia   - continue rosuvastatin 10 mg   5. Other closed fracture of first lumbar vertebra with routine healing, subsequent encounter   - check testosterone and DEXA scan   6. Low testosterone   - check testosterone   7. Anemia, unspecified type   - repeat CBC   8. Need for influenza vaccination       HPI     Here to establish care.  Previously his PCP was Dr. Rocha.  He is  for [employer] but is currently not able to work secondary to back pain.He has a past medical history of atrial fibrillation status post pacemaker and taking propafenone and Eliquis 2.5 mg b.i.d. he follows with Dr. Reyes.    He will follow-up with cardiology for this.  He has a history of hyperlipidemia for which he takes rosuvastatin, this is controlled so we will continue this.  He has lumbar radiculopathy causing chronic low back pain.  He sees pain management here as well as neuro surgery.  He has had JOHN.  He has had an evaluation by neuro surgery.  For now he will be treated with gabapentin which does make him a little sleepy but works well and he will follow up with pain management.  Continue gabapentin.  He has a history of low testosterone and had an L1 fracture from a ground level fall..  Therefore we will check a DEXA scan and his testosterone again.  Note insurance did not cover his testosterone in the past.  He walks his dog for exercise.  His sister had colon cancer so he gets his colonoscopies every 5 years.  He has a history of low testosterone.  Sildenafil p.r.n. works well for his erectile dysfunction.    Review of Systems    Constitutional:  Negative for fever.   Respiratory:  Negative for shortness of breath.    Cardiovascular:  Negative for chest pain.   Gastrointestinal:  Negative for abdominal pain.   Musculoskeletal:  Positive for back pain.       I personally reviewed past medical, family and social history.     Orders   1. Atrial fibrillation, unspecified type    2. Lumbar radiculopathy    3. Other hyperlipidemia    4. Other closed fracture of first lumbar vertebra with routine healing, subsequent encounter  - DXA Bone Density Axial Skeleton 1 or more sites; Future    5. Low testosterone  - Testosterone; Future    6. Anemia, unspecified type  - CBC Auto Differential; Future  - Iron and TIBC; Future  - Ferritin; Future  - Vitamin B12; Future    7. Need for influenza vaccination  - influenza (Flulaval, Fluzone, Fluarix) 45 mcg/0.5 mL IM vaccine (> or = 6 mo) 0.5 mL    8. Family history of colon cancer- sister        Objective    Vitals:    10/16/24 0759   BP: 120/60   Pulse: 74      Wt Readings from Last 3 Encounters:   10/16/24 0759 77.1 kg (169 lb 15.6 oz)   10/02/24 1154 76.6 kg (168 lb 14 oz)   09/19/24 1356 79.4 kg (175 lb 0.7 oz)      Body mass index is 24.39 kg/m².     Physical Exam  Cardiovascular:      Rate and Rhythm: Normal rate and regular rhythm.      Heart sounds: No murmur heard.     No gallop.   Pulmonary:      Breath sounds: Normal breath sounds. No wheezing or rhonchi.   Abdominal:      Palpations: Abdomen is soft.      Tenderness: There is no abdominal tenderness.        Reference     : Visit today included increased complexity associated with the care of the episodic problem L1 fracture addressed and managing the longitudinal care of the patient due to the serious and/or complex managed problem(s)     Active Problem List with Overview Notes    Diagnosis Date Noted    Atrial fibrillation 10/16/2024    Lumbar radiculopathy 10/16/2024    Family history of colon cancer- sister 10/16/2024    Other  hyperlipidemia 10/18/2022    Screening PSA (prostate specific antigen) 10/18/2022    Encounter for long-term (current) use of medications 10/18/2022              Hyperlipidemia Medications               rosuvastatin (CRESTOR) 10 MG tablet rosuvastatin 10 mg tablet   Take 1 tablet every day by oral route at bedtime for 90 days.           Medication List with Changes/Refills   Current Medications    ELIQUIS 2.5 MG TAB    Take 2.5 mg by mouth 2 (two) times daily.    GABAPENTIN (NEURONTIN) 300 MG CAPSULE    Take 1 capsule (300 mg total) by mouth 2 (two) times daily.    PROPAFENONE (RYTHMOL) 300 MG TABLET    Take 1 tablet by mouth 2 (two) times daily.    ROSUVASTATIN (CRESTOR) 10 MG TABLET    rosuvastatin 10 mg tablet   Take 1 tablet every day by oral route at bedtime for 90 days.    SILDENAFIL (VIAGRA) 100 MG TABLET    Take 100 mg by mouth.

## 2025-02-06 ENCOUNTER — OFFICE VISIT (OUTPATIENT)
Dept: PODIATRY | Facility: CLINIC | Age: 65
End: 2025-02-06
Payer: COMMERCIAL

## 2025-02-06 VITALS — HEIGHT: 70 IN | WEIGHT: 170 LBS | BODY MASS INDEX: 24.34 KG/M2

## 2025-02-06 DIAGNOSIS — L85.3 XEROSIS OF SKIN: ICD-10-CM

## 2025-02-06 DIAGNOSIS — B35.1 ONYCHOMYCOSIS DUE TO DERMATOPHYTE: Primary | ICD-10-CM

## 2025-02-06 PROCEDURE — 3008F BODY MASS INDEX DOCD: CPT | Mod: CPTII,S$GLB,, | Performed by: PODIATRIST

## 2025-02-06 PROCEDURE — 99999 PR PBB SHADOW E&M-EST. PATIENT-LVL III: CPT | Mod: PBBFAC,,, | Performed by: PODIATRIST

## 2025-02-06 PROCEDURE — 99204 OFFICE O/P NEW MOD 45 MIN: CPT | Mod: S$GLB,,, | Performed by: PODIATRIST

## 2025-02-06 PROCEDURE — 1159F MED LIST DOCD IN RCRD: CPT | Mod: CPTII,S$GLB,, | Performed by: PODIATRIST

## 2025-02-06 NOTE — PATIENT INSTRUCTIONS
Laser Treatment:    Dr. Michael Sterling, JAKM  Address: [address] [place] [address]  Phone: [phone]    Be sure to treat your shoes with lysol as dicussed.    Also, treat shower surfaces with lysol or bleach once weekly.    Apply the ebanel lotion to the dry areas around your heel once daily for a month.  Cover with a band aid to aid in softening the area.    Shoes: [place] or [place]

## 2025-02-08 NOTE — PROGRESS NOTES
Subjective:     Patient ID: Akin Coombs is a 64 y.o. male.    Chief Complaint: Nail Problem (Right great toenail discoloration, no toe pain)    Akin is a 64 y.o. male with a past medical history of Facet arthritis of lumbar region, Hyperlipidemia, and Pacemaker. The patient's chief complaint consists of pronounced discoloration of the Rt. Hallux toenail that has been present for quite awhile.  Denies this area being a source of pain, however, is concerned that this issue may worsen and spread to adjacent skin and nails.  He has not attempted to self treat, as he was unsure of the etiology.  Also, notes small, dry, superficial lesions along bilateral posterior medial heel.  Inquires as to how these can be resolved.  Denies any additional pedal complaints.      Past Medical History:   Diagnosis Date    Facet arthritis of lumbar region     Hyperlipidemia     Pacemaker        Past Surgical History:   Procedure Laterality Date    CARDIAC PACEMAKER PLACEMENT      Aug. 2022    EPIDURAL STEROID INJECTION INTO LUMBAR SPINE N/A 9/17/2024    Procedure: Injection-steroid-epidural-lumbar   L5/S1;  Surgeon: Macario Merino MD;  Location: [facility] OR;  Service: Pain Management;  Laterality: N/A;    ROBOT-ASSISTED LAPAROSCOPIC REPAIR OF INGUINAL HERNIA USING DA MARIBEL XI Left 02/02/2023    Procedure: XI ROBOTIC REPAIR, HERNIA, INGUINAL;  Surgeon: Darwin Reyes MD;  Location: [facility] OR;  Service: General;  Laterality: Left;       Family History   Problem Relation Name Age of Onset    Heart disease Father         Social History     Socioeconomic History    Marital status:    Tobacco Use    Smoking status: Never    Smokeless tobacco: Never   Substance and Sexual Activity    Alcohol use: Yes     Alcohol/week: 2.0 standard drinks of alcohol     Types: 2 Cans of beer per week     Comment: 1 beer a week    Drug use: Never     Social Drivers of Health     Financial Resource Strain: Low Risk  (10/9/2024)    Overall  Financial Resource Strain (CARDIA)     Difficulty of Paying Living Expenses: Not hard at all   Food Insecurity: No Food Insecurity (10/9/2024)    Hunger Vital Sign     Worried About Running Out of Food in the Last Year: Never true     Ran Out of Food in the Last Year: Never true   Transportation Needs: No Transportation Needs (6/19/2024)    Received from [facility] - Transportation     Lack of Transportation (Medical): No     Lack of Transportation (Non-Medical): No   Physical Activity: Sufficiently Active (10/9/2024)    Exercise Vital Sign     Days of Exercise per Week: 7 days     Minutes of Exercise per Session: 50 min   Stress: No Stress Concern Present (10/9/2024)    [employer] - Occupational Stress Questionnaire     Feeling of Stress : Not at all   Housing Stability: Low Risk  (1/21/2023)    Housing Stability Vital Sign     Unable to Pay for Housing in the Last Year: No     Number of Places Lived in the Last Year: 1     Unstable Housing in the Last Year: No       Current Outpatient Medications   Medication Sig Dispense Refill    ELIQUIS 2.5 mg Tab Take 2.5 mg by mouth 2 (two) times daily.      gabapentin (NEURONTIN) 300 MG capsule Take 1 capsule (300 mg total) by mouth 2 (two) times daily. 60 capsule 0    propafenone (RYTHMOL) 300 MG tablet Take 1 tablet by mouth 2 (two) times daily.      rosuvastatin (CRESTOR) 10 MG tablet rosuvastatin 10 mg tablet   Take 1 tablet every day by oral route at bedtime for 90 days.      sildenafiL (VIAGRA) 100 MG tablet Take 100 mg by mouth.       No current facility-administered medications for this visit.       Review of patient's allergies indicates:   Allergen Reactions    No known drug allergies         Hemoglobin A1C   Date Value Ref Range Status   10/15/2024 5.3 0.0 - 5.6 % Final     Comment:     Reference Interval:  5.0 - 5.6 Normal   5.7 - 6.4 High Risk   > 6.5 Diabetic      Hgb A1c results are standardized based  on the (NGSP) National   Glycohemoglobin Standardization Program.      Hemoglobin A1C levels are related to mean serum/plasma glucose   during the preceding 2-3 months.            Review of Systems   Constitutional: Negative for chills and fever.   Skin:  Positive for dry skin and nail changes.   Musculoskeletal:  Negative for joint pain, joint swelling, muscle cramps and muscle weakness.   Gastrointestinal:  Negative for nausea and vomiting.   Neurological:  Negative for numbness and paresthesias.   Psychiatric/Behavioral:  Negative for altered mental status.         Objective:     Physical Exam  Constitutional:       Appearance: Normal appearance. He is not ill-appearing.   Cardiovascular:      Pulses:           Dorsalis pedis pulses are 2+ on the right side and 2+ on the left side.        Posterior tibial pulses are 2+ on the right side and 2+ on the left side.      Comments: CFT is < 3 seconds bilateral.  Pedal hair growth is present bilateral.  No lower extremity edema noted bilateral. Toes are warm to touch bilateral.    Musculoskeletal:         General: Deformity present. No tenderness or signs of injury.      Right lower leg: No edema.      Left lower leg: No edema.      Comments: Muscle strength 5/5 in all muscle groups bilateral.  No tenderness nor crepitation with ROM of foot/ankle joints bilateral.  No tenderness with palpation of bilateral foot and ankle.   Bilateral semi-reducible contracture of toes 2-5.     Skin:     Findings: No bruising, ecchymosis, erythema, signs of injury, laceration, lesion, petechiae, rash or wound.      Comments: Pedal skin has normal turgor, temperature, and texture bilateral.  Mycotic changes noted along the entire 1/3 medial border of the Rt. Hallux toenail.  Remaining toenails x 9 appear normotrophic. Xerosis of skin to bilateral posterior medial heel.   Neurological:      General: No focal deficit present.      Mental Status: He is alert.      Sensory: No sensory  deficit.      Motor: No weakness or atrophy.      Comments: Light touch is intact bilateral.           Assessment:      Encounter Diagnoses   Name Primary?    Onychomycosis due to dermatophyte Yes    Xerosis of skin      Plan:     Akin was seen today for nail problem.    Diagnoses and all orders for this visit:    Onychomycosis due to dermatophyte    Xerosis of skin      I counseled the patient on his conditions, their implications and medical management.    Discussed with patient a variety of treatment options to address onychomycosis including Alexis Vaporub, topical/oral antifungals, and laser therapy.  Patient is amenable to laser therapy.  I provided the name of a provider who provides this service.    Advised to regularly clean shoe gear and shower surfaces to limit exposure.    Advised to be wary of walking barefoot on carpeted surfaces at gyms and hotel rooms.  Also, avoid barefoot walking at public showers and pool areas.    Recommend applying Ebanel lotion to areas of dry skin QD.  May cover these areas with a waterproof band aid, upon application, to speed up skin softening.     RTC prn.    Devon Toscano DPM

## 2025-03-05 LAB — CRC RECOMMENDATION EXT: NORMAL

## 2025-03-10 ENCOUNTER — PATIENT OUTREACH (OUTPATIENT)
Dept: ADMINISTRATIVE | Facility: HOSPITAL | Age: 65
End: 2025-03-10
Payer: COMMERCIAL

## 2025-04-14 ENCOUNTER — HOSPITAL ENCOUNTER (OUTPATIENT)
Dept: RADIOLOGY | Facility: HOSPITAL | Age: 65
Discharge: HOME | End: 2025-04-14
Attending: ANESTHESIOLOGY
Payer: COMMERCIAL

## 2025-04-14 ENCOUNTER — OFFICE VISIT (OUTPATIENT)
Dept: PAIN MEDICINE | Facility: CLINIC | Age: 65
End: 2025-04-14
Payer: COMMERCIAL

## 2025-04-14 VITALS
HEIGHT: 70 IN | WEIGHT: 177.81 LBS | DIASTOLIC BLOOD PRESSURE: 82 MMHG | BODY MASS INDEX: 25.45 KG/M2 | SYSTOLIC BLOOD PRESSURE: 123 MMHG | HEART RATE: 83 BPM

## 2025-04-14 DIAGNOSIS — M79.18 MYOFASCIAL PAIN: ICD-10-CM

## 2025-04-14 DIAGNOSIS — M54.16 LUMBAR RADICULOPATHY: ICD-10-CM

## 2025-04-14 DIAGNOSIS — M48.061 SPINAL STENOSIS OF LUMBAR REGION, UNSPECIFIED WHETHER NEUROGENIC CLAUDICATION PRESENT: ICD-10-CM

## 2025-04-14 DIAGNOSIS — M54.9 DORSALGIA, UNSPECIFIED: ICD-10-CM

## 2025-04-14 DIAGNOSIS — M54.9 DORSALGIA, UNSPECIFIED: Primary | ICD-10-CM

## 2025-04-14 PROCEDURE — 72080 X-RAY EXAM THORACOLMB 2/> VW: CPT | Mod: TC,PO

## 2025-04-14 PROCEDURE — 3079F DIAST BP 80-89 MM HG: CPT | Mod: CPTII,S$GLB,, | Performed by: ANESTHESIOLOGY

## 2025-04-14 PROCEDURE — 3008F BODY MASS INDEX DOCD: CPT | Mod: CPTII,S$GLB,, | Performed by: ANESTHESIOLOGY

## 2025-04-14 PROCEDURE — 99999 PR PBB SHADOW E&M-EST. PATIENT-LVL III: CPT | Mod: PBBFAC,,, | Performed by: ANESTHESIOLOGY

## 2025-04-14 PROCEDURE — 1159F MED LIST DOCD IN RCRD: CPT | Mod: CPTII,S$GLB,, | Performed by: ANESTHESIOLOGY

## 2025-04-14 PROCEDURE — 72080 X-RAY EXAM THORACOLMB 2/> VW: CPT | Mod: 26,,, | Performed by: RADIOLOGY

## 2025-04-14 PROCEDURE — 3074F SYST BP LT 130 MM HG: CPT | Mod: CPTII,S$GLB,, | Performed by: ANESTHESIOLOGY

## 2025-04-14 PROCEDURE — 99214 OFFICE O/P EST MOD 30 MIN: CPT | Mod: S$GLB,,, | Performed by: ANESTHESIOLOGY

## 2025-04-14 NOTE — PROGRESS NOTES
[facility] Follow Up Evaluation      Referred by: No ref. provider found    PCP:     CC:   Chief Complaint   Patient presents with    Injections    Back Pain    Follow-up          4/14/2025    10:36 AM 10/2/2024    11:53 AM 8/5/2024     3:05 PM   Last 3 PDI Scores   Pain Disability Index (PDI) 38 22 12       Interval HPI 4/14/25: Mr. Coombs returns to the office for follow up.  Today he reports worsening back pain.  He rates his back pain as 7/10.  He denies any numbness or weakness.    HPI:   Akin Coombs is a 64 y.o. male patient who has a past medical history of Facet arthritis of lumbar region, History of colonic polyps, Hyperlipidemia, and Pacemaker. He presents with back pain.  Has had chronic back pain for over the past 6 years.  Today he reports his pain ranges between 6-7/10, intermittent, aching, sharp, tight.  Has radiating pain into the left thigh.  Pain is worse with sitting, standing and doing yd work and bending and relieved with rest and lying down.      Pain Intervention History:  - s/p L5/S1 JOHN on 9/17/24 with over 50% relief last for over 6 months    Past Spine Surgical History:      Past and current medications:  Antineuropathics: gabapentin   NSAIDs:  Physical therapy: yes, completed in the past  Antidepressants:  Muscle relaxers: flexeril   Opioids:  Antiplatelets/Anticoagulants: eliquis     History:    Current Outpatient Medications:     ELIQUIS 2.5 mg Tab, Take 2.5 mg by mouth 2 (two) times daily., Disp: , Rfl:     gabapentin (NEURONTIN) 300 MG capsule, Take 1 capsule (300 mg total) by mouth 2 (two) times daily., Disp: 60 capsule, Rfl: 0    propafenone (RYTHMOL) 300 MG tablet, Take 1 tablet by mouth 2 (two) times daily., Disp: , Rfl:     rosuvastatin (CRESTOR) 10 MG tablet, rosuvastatin 10 mg tablet  Take 1 tablet every day by oral route at bedtime for 90 days., Disp: , Rfl:     sildenafiL (VIAGRA) 100 MG tablet, Take 100 mg by mouth., Disp: , Rfl:     Past Medical History:    Diagnosis Date    Facet arthritis of lumbar region     History of colonic polyps     Hyperlipidemia     Pacemaker        Past Surgical History:   Procedure Laterality Date    CARDIAC PACEMAKER PLACEMENT      Aug. 2022    COLONOSCOPY  03/05/2025    EPIDURAL STEROID INJECTION INTO LUMBAR SPINE N/A 09/17/2024    Procedure: Injection-steroid-epidural-lumbar   L5/S1;  Surgeon: Macario Merino MD;  Location: [facility] OR;  Service: Pain Management;  Laterality: N/A;    ROBOT-ASSISTED LAPAROSCOPIC REPAIR OF INGUINAL HERNIA USING DA MARIBEL XI Left 02/02/2023    Procedure: XI ROBOTIC REPAIR, HERNIA, INGUINAL;  Surgeon: Darwin Reyes MD;  Location: [facility] OR;  Service: General;  Laterality: Left;       Family History   Problem Relation Name Age of Onset    Heart disease Father         Social History     Socioeconomic History    Marital status:    Tobacco Use    Smoking status: Never    Smokeless tobacco: Never   Substance and Sexual Activity    Alcohol use: Yes     Alcohol/week: 2.0 standard drinks of alcohol     Types: 2 Cans of beer per week     Comment: 1 beer a week    Drug use: Never     Social Drivers of Health     Financial Resource Strain: Low Risk  (10/9/2024)    Overall Financial Resource Strain (CARDIA)     Difficulty of Paying Living Expenses: Not hard at all   Food Insecurity: No Food Insecurity (10/9/2024)    Hunger Vital Sign     Worried About Running Out of Food in the Last Year: Never true     Ran Out of Food in the Last Year: Never true   Transportation Needs: No Transportation Needs (6/19/2024)    [facility]    [facility]     Lack of Transportation (Medical): No     Lack of Transportation (Non-Medical): No   Physical Activity: Sufficiently Active (10/9/2024)    Exercise Vital Sign     Days of Exercise per Week: 7 days     Minutes of Exercise per Session: 50 min   Stress: No Stress Concern Present (10/9/2024)    [employer] - Occupational Stress  "Questionnaire     Feeling of Stress : Not at all   Housing Stability: Low Risk  (1/21/2023)    Housing Stability Vital Sign     Unable to Pay for Housing in the Last Year: No     Number of Places Lived in the Last Year: 1     Unstable Housing in the Last Year: No       Review of patient's allergies indicates:   Allergen Reactions    No known drug allergies        Review of Systems:  12 point review of systems is negative.    Physical Exam:  Vitals:    04/14/25 1037   BP: 123/82   Pulse: 83   Weight: 80.7 kg (177 lb 12.8 oz)   Height: 5' 10" (1.778 m)   PainSc:   7   PainLoc: Back       Body mass index is 25.51 kg/m².    Gen: NAD  Psych: mood appropriate for given condition  HEENT: eyes anicteric   CV: RRR  HEENT: anicteric   Respiratory: non-labored, no signs of respiratory distress  Abd: non-distended  Skin: warm, dry and intact.  Gait: No antalgic gait.     Sensory:  Intact and symmetrical to light touch in L1-S1 dermatomes bilaterally.    Motor:     Right Left   L2/3 Iliacus Hip flexion  5  5   L3/4 Qudratus Femoris Knee Extension  5  5   L4/5 Tib Anterior Ankle Dorsiflexion   5  5   L5/S1 Extensor Hallicus Longus Great toe extension  5  5   S1/S2 Gastroc/Soleus Plantar Flexion  5  5      Right Left                  Patellar DTR 2+ 2+   Achilles DTR 0 0                      Labs:  Lab Results   Component Value Date    LABA1C 5.3 10/25/2016    HGBA1C 5.3 10/15/2024       Lab Results   Component Value Date    WBC 4.74 10/15/2024    HGB 15.0 10/15/2024    HCT 43.4 10/15/2024    MCV 96 10/15/2024     (L) 10/15/2024         Imaging:  MRI lumbar spine 9/18/24  FINDINGS:  NOMENCLATURE: Five lumbar type vertebral bodies.     CORD/CAUDA EQUINA: Conus has normal size and signal and ends at a normal level of L1-L2.  Homogeneously enhancing 5 x 3 x 5 mm enhancing nodule in the left paramedian dorsal thecal sac at the T12 level (series 15, image 6, series 14, image 9), posterior to the conus.  This corresponds to the " finding of concern on prior study from 08/21/2024.  No other lesions are identified.  Incidental small sacral Tarlov cysts are again noted.     ALIGNMENT: Trace retrolisthesis of L1 on L2.  4 mm grade 1 anterolisthesis of L4 on L5.     BONES: Chronic L1 vertebral body compression fracture with 70% height loss centrally.  No aggressive bone marrow signal.     PARASPINAL AREA: Normal.     LUMBAR DISC LEVELS:  T12-L1: No disc herniation or significant posterior osteophytic ridging. No significant spinal canal or foraminal stenosis.  L1-L2: Trace retrolisthesis.  Minimal disc bulge.  Mild bilateral facet hypertrophy.  No significant spinal canal or foraminal stenosis.  L2-L3: Minimal disc bulge.  Mild bilateral facet hypertrophy.  Minimal narrowing of the bilateral lateral recesses.  No significant spinal canal or foraminal stenosis.  L3-L4: Minimal disc bulge.  Mild bilateral facet hypertrophy.  Mild narrowing of the bilateral lateral recesses.  No significant spinal canal or foraminal stenosis.  L4-L5: Anterolisthesis.  Unroofing of disc mild disc bulge.  Marked left and moderate-marked right facet hypertrophy.  Ligamentum flavum thickening.  Mild-moderate narrowing of the bilateral lateral recesses.  Unchanged moderate spinal canal stenosis.  Mild bilateral foraminal stenosis.  L5-S1: Mild disc bulge.  Moderate-marked right and mild left facet hypertrophy.  Mild narrowing of the bilateral lateral recesses.  No significant spinal canal stenosis.  Minimal bilateral foraminal stenosis.      Assessment:   Problem List Items Addressed This Visit          Neuro    Lumbar radiculopathy     Other Visit Diagnoses         Dorsalgia, unspecified    -  Primary    Relevant Orders    X-Ray Thoracolumbar Spine AP Lateral      Spinal stenosis of lumbar region, unspecified whether neurogenic claudication present          Myofascial pain                  Akin Coombs is a 64 y.o. male patient who has a past medical history of Facet  arthritis of lumbar region, History of colonic polyps, Hyperlipidemia, and Pacemaker. He presents with back pain.  Has had chronic back pain for over the past 6 years.  Today he reports his pain ranges between 6-7/10, intermittent, aching, sharp, tight.  Has radiating pain into the left thigh.  Pain is worse with sitting, standing and doing yd work and bending and relieved with rest and lying down.      4/14/25 - Mr. Coombs returns to the office for follow up.  Today he reports worsening back pain.  He rates his back pain as 7/10.  He denies any numbness or weakness.    - on exam he has full strength in his lower extremities and intact sensation to light touch bilateral L2-S1.  He has pain with lumbar facet loading on the right side.  No obvious tenderness over the midline thoracic or lumbar spine.  - I independently reviewed his updated lumbar MRI with him.  He has multilevel bilateral facet arthropathy.  At L4-5 he has at least moderate central canal narrowing  - over the past 12 months he has completed formal physical therapy and maintains PT directed home exercise program but does not found like he found good relief with this.  His PT included dry needling for myofascial pain.  He has used Flexeril in the past.  He continues to use gabapentin for the neuropathic component of his pain  - I discussed with him that I think he likely has a combination of soft tissue and myofascial pain but unfortunately he has failed to find relief with conservative treatment for this.  He has at least moderate central canal narrowing in the lower back and I discussed this may be flared up causing worsening symptoms of myofascial pain across his back.  He is status post L5-S1 JOHN on 09/17/2024 with well over 50% relief of his pain lasting for over 6 months. He has a history of previous L1 compression fracture.  I am going to order an updated thoracolumbar x-ray to make sure there are no bony changes  - we will schedule for repeat  L5-S1 JOHN. The risks and benefits of this intervention, and alternative therapies were discussed with the patient.  The discussion of risks included infection, bleeding, need for additional procedures or surgery, nerve damage.  Questions regarding the procedure, risks, expected outcome, and possible side effects were solicited and answered to the patient's satisfaction.  Akin Coombs wishes to proceed with the injection or procedure.  Written consent was obtained.  - follow up 2-3 weeks post injection  - we will get clearance for him to hold his Eliquis prior to JOHN      : Not applicable    Macario Merino M.D.  Interventional Pain Medicine / Anesthesiology    This note was completed with dictation software and grammatical errors may exist.

## 2025-04-14 NOTE — H&P (VIEW-ONLY)
[facility] Follow Up Evaluation      Referred by: No ref. provider found    PCP:     CC:   Chief Complaint   Patient presents with    Injections    Back Pain    Follow-up          4/14/2025    10:36 AM 10/2/2024    11:53 AM 8/5/2024     3:05 PM   Last 3 PDI Scores   Pain Disability Index (PDI) 38 22 12       Interval HPI 4/14/25: Mr. Coombs returns to the office for follow up.  Today he reports worsening back pain.  He rates his back pain as 7/10.  He denies any numbness or weakness.    HPI:   Akin Coombs is a 64 y.o. male patient who has a past medical history of Facet arthritis of lumbar region, History of colonic polyps, Hyperlipidemia, and Pacemaker. He presents with back pain.  Has had chronic back pain for over the past 6 years.  Today he reports his pain ranges between 6-7/10, intermittent, aching, sharp, tight.  Has radiating pain into the left thigh.  Pain is worse with sitting, standing and doing yd work and bending and relieved with rest and lying down.      Pain Intervention History:  - s/p L5/S1 JOHN on 9/17/24 with over 50% relief last for over 6 months    Past Spine Surgical History:      Past and current medications:  Antineuropathics: gabapentin   NSAIDs:  Physical therapy: yes, completed in the past  Antidepressants:  Muscle relaxers: flexeril   Opioids:  Antiplatelets/Anticoagulants: eliquis     History:    Current Outpatient Medications:     ELIQUIS 2.5 mg Tab, Take 2.5 mg by mouth 2 (two) times daily., Disp: , Rfl:     gabapentin (NEURONTIN) 300 MG capsule, Take 1 capsule (300 mg total) by mouth 2 (two) times daily., Disp: 60 capsule, Rfl: 0    propafenone (RYTHMOL) 300 MG tablet, Take 1 tablet by mouth 2 (two) times daily., Disp: , Rfl:     rosuvastatin (CRESTOR) 10 MG tablet, rosuvastatin 10 mg tablet  Take 1 tablet every day by oral route at bedtime for 90 days., Disp: , Rfl:     sildenafiL (VIAGRA) 100 MG tablet, Take 100 mg by mouth., Disp: , Rfl:     Past Medical History:    Diagnosis Date    Facet arthritis of lumbar region     History of colonic polyps     Hyperlipidemia     Pacemaker        Past Surgical History:   Procedure Laterality Date    CARDIAC PACEMAKER PLACEMENT      Aug. 2022    COLONOSCOPY  03/05/2025    EPIDURAL STEROID INJECTION INTO LUMBAR SPINE N/A 09/17/2024    Procedure: Injection-steroid-epidural-lumbar   L5/S1;  Surgeon: Macario Merino MD;  Location: [facility] OR;  Service: Pain Management;  Laterality: N/A;    ROBOT-ASSISTED LAPAROSCOPIC REPAIR OF INGUINAL HERNIA USING DA MARIBEL XI Left 02/02/2023    Procedure: XI ROBOTIC REPAIR, HERNIA, INGUINAL;  Surgeon: Darwin Reyes MD;  Location: [facility] OR;  Service: General;  Laterality: Left;       Family History   Problem Relation Name Age of Onset    Heart disease Father         Social History     Socioeconomic History    Marital status:    Tobacco Use    Smoking status: Never    Smokeless tobacco: Never   Substance and Sexual Activity    Alcohol use: Yes     Alcohol/week: 2.0 standard drinks of alcohol     Types: 2 Cans of beer per week     Comment: 1 beer a week    Drug use: Never     Social Drivers of Health     Financial Resource Strain: Low Risk  (10/9/2024)    Overall Financial Resource Strain (CARDIA)     Difficulty of Paying Living Expenses: Not hard at all   Food Insecurity: No Food Insecurity (10/9/2024)    Hunger Vital Sign     Worried About Running Out of Food in the Last Year: Never true     Ran Out of Food in the Last Year: Never true   Transportation Needs: No Transportation Needs (6/19/2024)    [facility]    [facility]     Lack of Transportation (Medical): No     Lack of Transportation (Non-Medical): No   Physical Activity: Sufficiently Active (10/9/2024)    Exercise Vital Sign     Days of Exercise per Week: 7 days     Minutes of Exercise per Session: 50 min   Stress: No Stress Concern Present (10/9/2024)    [employer] - Occupational Stress  "Questionnaire     Feeling of Stress : Not at all   Housing Stability: Low Risk  (1/21/2023)    Housing Stability Vital Sign     Unable to Pay for Housing in the Last Year: No     Number of Places Lived in the Last Year: 1     Unstable Housing in the Last Year: No       Review of patient's allergies indicates:   Allergen Reactions    No known drug allergies        Review of Systems:  12 point review of systems is negative.    Physical Exam:  Vitals:    04/14/25 1037   BP: 123/82   Pulse: 83   Weight: 80.7 kg (177 lb 12.8 oz)   Height: 5' 10" (1.778 m)   PainSc:   7   PainLoc: Back       Body mass index is 25.51 kg/m².    Gen: NAD  Psych: mood appropriate for given condition  HEENT: eyes anicteric   CV: RRR  HEENT: anicteric   Respiratory: non-labored, no signs of respiratory distress  Abd: non-distended  Skin: warm, dry and intact.  Gait: No antalgic gait.     Sensory:  Intact and symmetrical to light touch in L1-S1 dermatomes bilaterally.    Motor:     Right Left   L2/3 Iliacus Hip flexion  5  5   L3/4 Qudratus Femoris Knee Extension  5  5   L4/5 Tib Anterior Ankle Dorsiflexion   5  5   L5/S1 Extensor Hallicus Longus Great toe extension  5  5   S1/S2 Gastroc/Soleus Plantar Flexion  5  5      Right Left                  Patellar DTR 2+ 2+   Achilles DTR 0 0                      Labs:  Lab Results   Component Value Date    LABA1C 5.3 10/25/2016    HGBA1C 5.3 10/15/2024       Lab Results   Component Value Date    WBC 4.74 10/15/2024    HGB 15.0 10/15/2024    HCT 43.4 10/15/2024    MCV 96 10/15/2024     (L) 10/15/2024         Imaging:  MRI lumbar spine 9/18/24  FINDINGS:  NOMENCLATURE: Five lumbar type vertebral bodies.     CORD/CAUDA EQUINA: Conus has normal size and signal and ends at a normal level of L1-L2.  Homogeneously enhancing 5 x 3 x 5 mm enhancing nodule in the left paramedian dorsal thecal sac at the T12 level (series 15, image 6, series 14, image 9), posterior to the conus.  This corresponds to the " finding of concern on prior study from 08/21/2024.  No other lesions are identified.  Incidental small sacral Tarlov cysts are again noted.     ALIGNMENT: Trace retrolisthesis of L1 on L2.  4 mm grade 1 anterolisthesis of L4 on L5.     BONES: Chronic L1 vertebral body compression fracture with 70% height loss centrally.  No aggressive bone marrow signal.     PARASPINAL AREA: Normal.     LUMBAR DISC LEVELS:  T12-L1: No disc herniation or significant posterior osteophytic ridging. No significant spinal canal or foraminal stenosis.  L1-L2: Trace retrolisthesis.  Minimal disc bulge.  Mild bilateral facet hypertrophy.  No significant spinal canal or foraminal stenosis.  L2-L3: Minimal disc bulge.  Mild bilateral facet hypertrophy.  Minimal narrowing of the bilateral lateral recesses.  No significant spinal canal or foraminal stenosis.  L3-L4: Minimal disc bulge.  Mild bilateral facet hypertrophy.  Mild narrowing of the bilateral lateral recesses.  No significant spinal canal or foraminal stenosis.  L4-L5: Anterolisthesis.  Unroofing of disc mild disc bulge.  Marked left and moderate-marked right facet hypertrophy.  Ligamentum flavum thickening.  Mild-moderate narrowing of the bilateral lateral recesses.  Unchanged moderate spinal canal stenosis.  Mild bilateral foraminal stenosis.  L5-S1: Mild disc bulge.  Moderate-marked right and mild left facet hypertrophy.  Mild narrowing of the bilateral lateral recesses.  No significant spinal canal stenosis.  Minimal bilateral foraminal stenosis.      Assessment:   Problem List Items Addressed This Visit          Neuro    Lumbar radiculopathy     Other Visit Diagnoses         Dorsalgia, unspecified    -  Primary    Relevant Orders    X-Ray Thoracolumbar Spine AP Lateral      Spinal stenosis of lumbar region, unspecified whether neurogenic claudication present          Myofascial pain                  Akin Coombs is a 64 y.o. male patient who has a past medical history of Facet  arthritis of lumbar region, History of colonic polyps, Hyperlipidemia, and Pacemaker. He presents with back pain.  Has had chronic back pain for over the past 6 years.  Today he reports his pain ranges between 6-7/10, intermittent, aching, sharp, tight.  Has radiating pain into the left thigh.  Pain is worse with sitting, standing and doing yd work and bending and relieved with rest and lying down.      4/14/25 - Mr. Coombs returns to the office for follow up.  Today he reports worsening back pain.  He rates his back pain as 7/10.  He denies any numbness or weakness.    - on exam he has full strength in his lower extremities and intact sensation to light touch bilateral L2-S1.  He has pain with lumbar facet loading on the right side.  No obvious tenderness over the midline thoracic or lumbar spine.  - I independently reviewed his updated lumbar MRI with him.  He has multilevel bilateral facet arthropathy.  At L4-5 he has at least moderate central canal narrowing  - over the past 12 months he has completed formal physical therapy and maintains PT directed home exercise program but does not found like he found good relief with this.  His PT included dry needling for myofascial pain.  He has used Flexeril in the past.  He continues to use gabapentin for the neuropathic component of his pain  - I discussed with him that I think he likely has a combination of soft tissue and myofascial pain but unfortunately he has failed to find relief with conservative treatment for this.  He has at least moderate central canal narrowing in the lower back and I discussed this may be flared up causing worsening symptoms of myofascial pain across his back.  He is status post L5-S1 JOHN on 09/17/2024 with well over 50% relief of his pain lasting for over 6 months. He has a history of previous L1 compression fracture.  I am going to order an updated thoracolumbar x-ray to make sure there are no bony changes  - we will schedule for repeat  L5-S1 JOHN. The risks and benefits of this intervention, and alternative therapies were discussed with the patient.  The discussion of risks included infection, bleeding, need for additional procedures or surgery, nerve damage.  Questions regarding the procedure, risks, expected outcome, and possible side effects were solicited and answered to the patient's satisfaction.  Akin Coombs wishes to proceed with the injection or procedure.  Written consent was obtained.  - follow up 2-3 weeks post injection  - we will get clearance for him to hold his Eliquis prior to JOHN      : Not applicable    Macario Merino M.D.  Interventional Pain Medicine / Anesthesiology    This note was completed with dictation software and grammatical errors may exist.

## 2025-04-17 ENCOUNTER — PATIENT MESSAGE (OUTPATIENT)
Dept: PAIN MEDICINE | Facility: CLINIC | Age: 65
End: 2025-04-17
Payer: COMMERCIAL

## 2025-04-17 NOTE — TELEPHONE ENCOUNTER
Physician - Dr Merino    Type of Procedure/Injection - Lumbar Epidural  L5/S1           Laterality - NA      Priority - Normal      Anxiolysis- Local      Need to hold medication - Yes      Eliquis for 3 days      Clearance needed - Yes      Follow up - 3 week

## 2025-04-21 DIAGNOSIS — M54.16 LUMBAR RADICULOPATHY: Primary | ICD-10-CM

## 2025-04-21 RX ORDER — ALPRAZOLAM 1 MG/1
1 TABLET, ORALLY DISINTEGRATING ORAL ONCE AS NEEDED
OUTPATIENT
Start: 2025-04-21 | End: 2036-09-17

## 2025-04-21 NOTE — TELEPHONE ENCOUNTER
Spoke with patient and scheduled procedure and follow up. Let him know to hold Eliquis 3 days prior and faxed cardiac clearance to Dr. Reyes

## 2025-04-23 ENCOUNTER — TELEPHONE (OUTPATIENT)
Dept: PAIN MEDICINE | Facility: CLINIC | Age: 65
End: 2025-04-23
Payer: COMMERCIAL

## 2025-04-30 ENCOUNTER — HOSPITAL ENCOUNTER (OUTPATIENT)
Dept: RADIOLOGY | Facility: HOSPITAL | Age: 65
Discharge: HOME | End: 2025-04-30
Attending: ANESTHESIOLOGY
Payer: COMMERCIAL

## 2025-04-30 ENCOUNTER — TELEPHONE (OUTPATIENT)
Dept: PAIN MEDICINE | Facility: CLINIC | Age: 65
End: 2025-04-30
Payer: COMMERCIAL

## 2025-04-30 ENCOUNTER — HOSPITAL ENCOUNTER (OUTPATIENT)
Facility: HOSPITAL | Age: 65
Discharge: HOME | End: 2025-04-30
Attending: ANESTHESIOLOGY | Admitting: ANESTHESIOLOGY
Payer: COMMERCIAL

## 2025-04-30 DIAGNOSIS — M54.50 LOWER BACK PAIN: ICD-10-CM

## 2025-04-30 DIAGNOSIS — M54.16 LUMBAR RADICULOPATHY: Primary | ICD-10-CM

## 2025-04-30 PROCEDURE — A4216 STERILE WATER/SALINE, 10 ML: HCPCS | Mod: PO | Performed by: ANESTHESIOLOGY

## 2025-04-30 PROCEDURE — 25500020 PHARM REV CODE 255: Mod: PO | Performed by: ANESTHESIOLOGY

## 2025-04-30 PROCEDURE — 63600175 PHARM REV CODE 636 W HCPCS: Mod: PO | Performed by: ANESTHESIOLOGY

## 2025-04-30 PROCEDURE — 25000003 PHARM REV CODE 250: Mod: PO | Performed by: ANESTHESIOLOGY

## 2025-04-30 PROCEDURE — 62323 NJX INTERLAMINAR LMBR/SAC: CPT | Mod: ,,, | Performed by: ANESTHESIOLOGY

## 2025-04-30 PROCEDURE — 62323 NJX INTERLAMINAR LMBR/SAC: CPT | Mod: PO | Performed by: ANESTHESIOLOGY

## 2025-04-30 RX ORDER — ALPRAZOLAM 0.5 MG/1
1 TABLET, ORALLY DISINTEGRATING ORAL ONCE AS NEEDED
Status: DISCONTINUED | OUTPATIENT
Start: 2025-04-30 | End: 2025-04-30 | Stop reason: HOSPADM

## 2025-04-30 RX ORDER — SODIUM CHLORIDE 9 MG/ML
INJECTION, SOLUTION INTRAMUSCULAR; INTRAVENOUS; SUBCUTANEOUS
Status: DISCONTINUED | OUTPATIENT
Start: 2025-04-30 | End: 2025-04-30 | Stop reason: HOSPADM

## 2025-04-30 RX ORDER — LIDOCAINE HYDROCHLORIDE 10 MG/ML
INJECTION, SOLUTION EPIDURAL; INFILTRATION; INTRACAUDAL; PERINEURAL
Status: DISCONTINUED | OUTPATIENT
Start: 2025-04-30 | End: 2025-04-30 | Stop reason: HOSPADM

## 2025-04-30 RX ORDER — METHYLPREDNISOLONE ACETATE 80 MG/ML
INJECTION, SUSPENSION INTRA-ARTICULAR; INTRALESIONAL; INTRAMUSCULAR; SOFT TISSUE
Status: DISCONTINUED | OUTPATIENT
Start: 2025-04-30 | End: 2025-04-30 | Stop reason: HOSPADM

## 2025-04-30 NOTE — INTERVAL H&P NOTE
The patient has been examined and the H&P has been reviewed:    I concur with the findings and no changes have occurred since H&P was written.  He has held eliquis appropriately.       There are no hospital problems to display for this patient.

## 2025-04-30 NOTE — TELEPHONE ENCOUNTER
Spoke with pt who has procedure this evening 4/30 with Dr. Merino @ 4pm would like to know if he can eat breakfast. POC notified.

## 2025-04-30 NOTE — DISCHARGE SUMMARY
[facility]  Discharge Note  Short Stay    Admit Date: 4/30/2025    Discharge Date: 4/30/2025    Attending Physician: Macario Merino     Discharge Provider: Macario Merino    Diagnoses:  There are no hospital problems to display for this patient.      Discharged Condition: Good    Final Diagnoses: Lumbar radiculopathy [M54.16]    Disposition: Home or Self Care    Hospital Course: No complications, uneventful    Outcome of Hospitalization, Treatment, Procedure, or Surgery:  Patient was admitted for outpatient interventional pain management procedure. The patient tolerated the procedure well with no complications.    Follow up/Patient Instructions:  Follow up as scheduled in Pain Management office in 2-3 weeks.  Patient has received instructions and follow up date and time.    Medications:  Continue previous medications, except restart eliquis in 24 hours    Discharge Procedure Orders   Notify your health care provider if you experience any of the following:  temperature >100.4     Notify your health care provider if you experience any of the following:  persistent nausea and vomiting or diarrhea     Notify your health care provider if you experience any of the following:  severe uncontrolled pain     Notify your health care provider if you experience any of the following:  redness, tenderness, or signs of infection (pain, swelling, redness, odor or green/yellow discharge around incision site)     Notify your health care provider if you experience any of the following:  difficulty breathing or increased cough     Notify your health care provider if you experience any of the following:  severe persistent headache     Notify your health care provider if you experience any of the following:  worsening rash     Notify your health care provider if you experience any of the following:  persistent dizziness, light-headedness, or visual disturbances     Notify your health care provider if you experience any of the  following:  increased confusion or weakness     Activity as tolerated

## 2025-04-30 NOTE — OP NOTE
"Procedure Note    Procedure Date: 4/30/2025    Procedure Performed:  L5/S1 lumbar interlaminar epidural steroid injection under fluoroscopy.    Indications:  Akin Coombs presents with lumbar radiculitis/radiculopathy secondary to disc herniation, osteophyte/osteophyte complexes, and/or severe degenerative disc disease producing foraminal or central spinal stenosis.  The pain has been present for at least 4 weeks and the patient has failed to respond to noninvasive conservative care.  Pain rated by NRS at baseline prior to intervention is 6/10.  Their radiculitis/radiculopathy and/or neurogenic claudication is severe enough to greatly impact their quality of life or function.     Pre-op diagnosis: Lumbar Radiculopathy    Post-op diagnosis: same    Physician: Macario Merino MD    IV anxiolysis medications: none    Medications injected: depomedrol 80mg, 1% Lidocaine 1ml, 2 mL sterile, preservative-free normal saline.    Local anesthetic used: 1% Lidocaine, 1 ml    Estimated Blood Loss: none    Complications:  none    Technique:  The patient was interviewed in the holding area and Risks/Benefits were discussed, including, but not limited to, the possibility of new or different pain, bleeding or infection.   All questions were answered.  The patient understood and accepted risks.  Consent was verfied.  A time-out was taken to identify patient and procedure prior to starting the procedure. The patient was placed in the prone position on the fluoroscopy table. The area of the lumbar spine was prepped with Chloraprep x2 and draped in a sterile manner. The L5/S1 interspace was identified and marked under AP fluoroscopy. The skin and subcutaneous tissues overlying the targeted interspace were anesthetized with 3-5 mL of 1% lidocaine using a 25G, 1.5" needle.  A 20G, 3.5" Tuohy epidural needle was directed toward the interspace under fluoroscopic guidance until the ligamentum flavum was engaged. From this point, a loss of " resistance technique with a glass syringe and saline was used to identify entrance of the needle into the epidural space. Once loss of resistance was observed 1 mL of contrast solution was injected. An appropriate epidurogram was noted.  A 4 mL mixture consisting of saline, 1 mL 1% Lidocaine and 80 mg of depomedrol was injected slowly and without resistance.  The needle was flushed with normal saline and removed. The contrast was seen to be displaced after injection. Patient was awake/responsive during all injections.  The patient tolerated the procedure well and was transferred to the [facility] in stable condition.  The patient was monitored after the procedure and was given post-procedure and discharge instructions to follow at home. The patient was discharged in a stable condition.

## 2025-05-01 VITALS
SYSTOLIC BLOOD PRESSURE: 126 MMHG | OXYGEN SATURATION: 98 % | RESPIRATION RATE: 17 BRPM | TEMPERATURE: 98 F | DIASTOLIC BLOOD PRESSURE: 81 MMHG | HEART RATE: 77 BPM | HEIGHT: 70 IN | WEIGHT: 177 LBS | BODY MASS INDEX: 25.34 KG/M2

## 2025-05-21 ENCOUNTER — OFFICE VISIT (OUTPATIENT)
Dept: PAIN MEDICINE | Facility: CLINIC | Age: 65
End: 2025-05-21
Payer: COMMERCIAL

## 2025-05-21 VITALS
BODY MASS INDEX: 25.24 KG/M2 | SYSTOLIC BLOOD PRESSURE: 111 MMHG | HEART RATE: 85 BPM | DIASTOLIC BLOOD PRESSURE: 82 MMHG | WEIGHT: 175.94 LBS

## 2025-05-21 DIAGNOSIS — M54.9 DORSALGIA, UNSPECIFIED: ICD-10-CM

## 2025-05-21 DIAGNOSIS — M54.16 LUMBAR RADICULOPATHY: Primary | ICD-10-CM

## 2025-05-21 DIAGNOSIS — M48.061 SPINAL STENOSIS OF LUMBAR REGION, UNSPECIFIED WHETHER NEUROGENIC CLAUDICATION PRESENT: ICD-10-CM

## 2025-05-21 DIAGNOSIS — M47.816 LUMBAR SPONDYLOSIS: ICD-10-CM

## 2025-05-21 PROCEDURE — 1159F MED LIST DOCD IN RCRD: CPT | Mod: CPTII,S$GLB,,

## 2025-05-21 PROCEDURE — 3008F BODY MASS INDEX DOCD: CPT | Mod: CPTII,S$GLB,,

## 2025-05-21 PROCEDURE — 3079F DIAST BP 80-89 MM HG: CPT | Mod: CPTII,S$GLB,,

## 2025-05-21 PROCEDURE — 99213 OFFICE O/P EST LOW 20 MIN: CPT | Mod: S$GLB,,,

## 2025-05-21 PROCEDURE — 99999 PR PBB SHADOW E&M-EST. PATIENT-LVL III: CPT | Mod: PBBFAC,,,

## 2025-05-21 PROCEDURE — 3074F SYST BP LT 130 MM HG: CPT | Mod: CPTII,S$GLB,,

## 2025-05-21 NOTE — PROGRESS NOTES
[facility] Follow Up Evaluation      Referred by: No ref. provider found    PCP:     CC:   Chief Complaint   Patient presents with    Back Pain          5/21/2025    10:28 AM 4/14/2025    10:36 AM 10/2/2024    11:53 AM   Last 3 PDI Scores   Pain Disability Index (PDI) 30 38 22     Interval HPI 5/21/2025: Akin Coombs returns to the office for follow up.  He is s/p L5/S1 JOHN on 04/30/2025 with 50% relief.  Overall, doing well, remaining pain is manageable at this time.  No new numbness, weakness or any new changes to his bowel or bladder function.      HPI:   Akin Coombs is a 64 y.o. male patient who has a past medical history of Facet arthritis of lumbar region, History of colonic polyps, Hyperlipidemia, and Pacemaker. He presents with back pain.  Has had chronic back pain for over the past 6 years.  Today he reports his pain ranges between 6-7/10, intermittent, aching, sharp, tight.  Has radiating pain into the left thigh.  Pain is worse with sitting, standing and doing yd work and bending and relieved with rest and lying down.      Pain Intervention History:  - s/p L5/S1 JOHN on 9/17/24 with over 50% relief last for over 6 months  - s/p L5/S1 JOHN on 04/30/2025 with 50% relief.    Past Spine Surgical History:      Past and current medications:  Antineuropathics: gabapentin   NSAIDs:  Physical therapy: yes, completed in the past  Antidepressants:  Muscle relaxers: flexeril   Opioids:  Antiplatelets/Anticoagulants: eliquis     History:    Current Outpatient Medications:     ELIQUIS 2.5 mg Tab, Take 2.5 mg by mouth 2 (two) times daily., Disp: , Rfl:     gabapentin (NEURONTIN) 300 MG capsule, Take 1 capsule (300 mg total) by mouth 2 (two) times daily., Disp: 60 capsule, Rfl: 0    propafenone (RYTHMOL) 300 MG tablet, Take 1 tablet by mouth 2 (two) times daily., Disp: , Rfl:     rosuvastatin (CRESTOR) 10 MG tablet, rosuvastatin 10 mg tablet  Take 1 tablet every day by oral route at bedtime for 90 days.,  Disp: , Rfl:     sildenafiL (VIAGRA) 100 MG tablet, Take 100 mg by mouth., Disp: , Rfl:     Past Medical History:   Diagnosis Date    Facet arthritis of lumbar region     History of colonic polyps     Hyperlipidemia     Pacemaker        Past Surgical History:   Procedure Laterality Date    CARDIAC PACEMAKER PLACEMENT      Aug. 2022    COLONOSCOPY  03/05/2025    EPIDURAL STEROID INJECTION INTO LUMBAR SPINE N/A 09/17/2024    Procedure: Injection-steroid-epidural-lumbar   L5/S1;  Surgeon: Macario Merino MD;  Location: [facility] OR;  Service: Pain Management;  Laterality: N/A;    EPIDURAL STEROID INJECTION INTO LUMBAR SPINE N/A 4/30/2025    Procedure: Injection-steroid-epidural-lumbar L5/S1;  Surgeon: Macario Merino MD;  Location: [facility] OR;  Service: Pain Management;  Laterality: N/A;    ROBOT-ASSISTED LAPAROSCOPIC REPAIR OF INGUINAL HERNIA USING DA MARIBEL XI Left 02/02/2023    Procedure: XI ROBOTIC REPAIR, HERNIA, INGUINAL;  Surgeon: Darwin Reyes MD;  Location: [facility] OR;  Service: General;  Laterality: Left;       Family History   Problem Relation Name Age of Onset    Heart disease Father         Social History     Socioeconomic History    Marital status:    Tobacco Use    Smoking status: Never    Smokeless tobacco: Never   Substance and Sexual Activity    Alcohol use: Yes     Alcohol/week: 2.0 standard drinks of alcohol     Types: 2 Cans of beer per week     Comment: 1 beer a week    Drug use: Never     Social Drivers of Health     Financial Resource Strain: Low Risk  (10/9/2024)    Overall Financial Resource Strain (CARDIA)     Difficulty of Paying Living Expenses: Not hard at all   Food Insecurity: No Food Insecurity (10/9/2024)    Hunger Vital Sign     Worried About Running Out of Food in the Last Year: Never true     Ran Out of Food in the Last Year: Never true   Transportation Needs: No Transportation Needs (6/19/2024)    Received from [facility] - Transportation     Lack of Transportation  (Medical): No     Lack of Transportation (Non-Medical): No   Physical Activity: Sufficiently Active (10/9/2024)    Exercise Vital Sign     Days of Exercise per Week: 7 days     Minutes of Exercise per Session: 50 min   Stress: No Stress Concern Present (10/9/2024)    [employer] - Occupational Stress Questionnaire     Feeling of Stress : Not at all   Housing Stability: Low Risk  (1/21/2023)    Housing Stability Vital Sign     Unable to Pay for Housing in the Last Year: No     Number of Places Lived in the Last Year: 1     Unstable Housing in the Last Year: No       Review of patient's allergies indicates:   Allergen Reactions    No known drug allergies        Review of Systems:  12 point review of systems is negative.    Physical Exam:  Vitals:    05/21/25 1029   BP: 111/82   Pulse: 85   Weight: 79.8 kg (175 lb 14.8 oz)   PainSc:   7   PainLoc: Back         Body mass index is 25.24 kg/m².    Gen: NAD  Psych: mood appropriate for given condition  HEENT: eyes anicteric   CV: RRR  HEENT: anicteric   Respiratory: non-labored, no signs of respiratory distress  Abd: non-distended  Skin: warm, dry and intact.  Gait: No antalgic gait.     Sensory:  Intact and symmetrical to light touch in L1-S1 dermatomes bilaterally.    Motor:     Right Left   L2/3 Iliacus Hip flexion  5  5   L3/4 Qudratus Femoris Knee Extension  5  5   L4/5 Tib Anterior Ankle Dorsiflexion   5  5   L5/S1 Extensor Hallicus Longus Great toe extension  5  5   S1/S2 Gastroc/Soleus Plantar Flexion  5  5      Right Left                  Patellar DTR 2+ 2+   Achilles DTR 0 0                      Labs:  Lab Results   Component Value Date    LABA1C 5.3 10/25/2016    HGBA1C 5.3 10/15/2024       Lab Results   Component Value Date    WBC 4.74 10/15/2024    HGB 15.0 10/15/2024    HCT 43.4 10/15/2024    MCV 96 10/15/2024     (L) 10/15/2024         Imaging:  MRI lumbar spine 9/18/24  FINDINGS:  NOMENCLATURE: Five lumbar type vertebral  bodies.     CORD/CAUDA EQUINA: Conus has normal size and signal and ends at a normal level of L1-L2.  Homogeneously enhancing 5 x 3 x 5 mm enhancing nodule in the left paramedian dorsal thecal sac at the T12 level (series 15, image 6, series 14, image 9), posterior to the conus.  This corresponds to the finding of concern on prior study from 08/21/2024.  No other lesions are identified.  Incidental small sacral Tarlov cysts are again noted.     ALIGNMENT: Trace retrolisthesis of L1 on L2.  4 mm grade 1 anterolisthesis of L4 on L5.     BONES: Chronic L1 vertebral body compression fracture with 70% height loss centrally.  No aggressive bone marrow signal.     PARASPINAL AREA: Normal.     LUMBAR DISC LEVELS:  T12-L1: No disc herniation or significant posterior osteophytic ridging. No significant spinal canal or foraminal stenosis.  L1-L2: Trace retrolisthesis.  Minimal disc bulge.  Mild bilateral facet hypertrophy.  No significant spinal canal or foraminal stenosis.  L2-L3: Minimal disc bulge.  Mild bilateral facet hypertrophy.  Minimal narrowing of the bilateral lateral recesses.  No significant spinal canal or foraminal stenosis.  L3-L4: Minimal disc bulge.  Mild bilateral facet hypertrophy.  Mild narrowing of the bilateral lateral recesses.  No significant spinal canal or foraminal stenosis.  L4-L5: Anterolisthesis.  Unroofing of disc mild disc bulge.  Marked left and moderate-marked right facet hypertrophy.  Ligamentum flavum thickening.  Mild-moderate narrowing of the bilateral lateral recesses.  Unchanged moderate spinal canal stenosis.  Mild bilateral foraminal stenosis.  L5-S1: Mild disc bulge.  Moderate-marked right and mild left facet hypertrophy.  Mild narrowing of the bilateral lateral recesses.  No significant spinal canal stenosis.  Minimal bilateral foraminal stenosis.      Assessment:   Problem List Items Addressed This Visit       Lumbar radiculopathy - Primary     Other Visit Diagnoses          Dorsalgia, unspecified          Spinal stenosis of lumbar region, unspecified whether neurogenic claudication present          Lumbar spondylosis                    Akin Coombs is a 64 y.o. male patient who has a past medical history of Facet arthritis of lumbar region, History of colonic polyps, Hyperlipidemia, and Pacemaker. He presents with back pain.  Has had chronic back pain for over the past 6 years.  Today he reports his pain ranges between 6-7/10, intermittent, aching, sharp, tight.  Has radiating pain into the left thigh.  Pain is worse with sitting, standing and doing yd work and bending and relieved with rest and lying down.      5/21/2025: Akin Coombs returns to the office for follow up.  He is s/p L5/S1 JOHN on 04/30/2025 with 50% relief.  Overall, doing well, remaining pain is manageable at this time.  No new numbness, weakness or any new changes to his bowel or bladder function.    - He is s/p L5/S1 JOHN on 04/30/2025 with 50% relief.   - I independently reviewed his updated lumbar MRI  He has multilevel bilateral facet arthropathy.  At L4-5 he has at least moderate central canal narrowing  - he again responded well to the repeat epidural, remaining pain is manageable at this time.  - follow up as needed, can repeat in the future when indicated.      : Not applicable      This note was completed with dictation software and grammatical errors may exist.

## 2025-08-21 ENCOUNTER — PATIENT MESSAGE (OUTPATIENT)
Dept: FAMILY MEDICINE | Facility: CLINIC | Age: 65
End: 2025-08-21
Payer: COMMERCIAL

## 2025-08-28 ENCOUNTER — OFFICE VISIT (OUTPATIENT)
Dept: FAMILY MEDICINE | Facility: CLINIC | Age: 65
End: 2025-08-28
Payer: COMMERCIAL

## 2025-08-28 VITALS
DIASTOLIC BLOOD PRESSURE: 80 MMHG | HEIGHT: 70 IN | SYSTOLIC BLOOD PRESSURE: 110 MMHG | OXYGEN SATURATION: 97 % | BODY MASS INDEX: 25.44 KG/M2 | HEART RATE: 85 BPM | WEIGHT: 177.69 LBS

## 2025-08-28 DIAGNOSIS — E83.19 IRON OVERLOAD: ICD-10-CM

## 2025-08-28 DIAGNOSIS — Z00.00 ANNUAL PHYSICAL EXAM: Primary | ICD-10-CM

## 2025-08-28 DIAGNOSIS — E78.49 OTHER HYPERLIPIDEMIA: ICD-10-CM

## 2025-08-28 DIAGNOSIS — E55.9 VITAMIN D DEFICIENCY: ICD-10-CM

## 2025-08-28 DIAGNOSIS — Z00.00 PREVENTATIVE HEALTH CARE: ICD-10-CM

## 2025-08-28 DIAGNOSIS — G89.29 CHRONIC BILATERAL LOW BACK PAIN WITHOUT SCIATICA: ICD-10-CM

## 2025-08-28 DIAGNOSIS — I48.91 ATRIAL FIBRILLATION, UNSPECIFIED TYPE: ICD-10-CM

## 2025-08-28 DIAGNOSIS — D69.6 THROMBOCYTOPENIA: ICD-10-CM

## 2025-08-28 DIAGNOSIS — M54.50 CHRONIC BILATERAL LOW BACK PAIN WITHOUT SCIATICA: ICD-10-CM

## 2025-08-28 PROCEDURE — 99999 PR PBB SHADOW E&M-EST. PATIENT-LVL III: CPT | Mod: PBBFAC,,, | Performed by: INTERNAL MEDICINE

## 2025-08-28 RX ORDER — GABAPENTIN 300 MG/1
300 CAPSULE ORAL 2 TIMES DAILY
Qty: 180 CAPSULE | Refills: 3 | Status: SHIPPED | OUTPATIENT
Start: 2025-08-28

## 2025-08-29 ENCOUNTER — PATIENT MESSAGE (OUTPATIENT)
Dept: FAMILY MEDICINE | Facility: CLINIC | Age: 65
End: 2025-08-29
Payer: COMMERCIAL

## 2025-08-29 ENCOUNTER — LAB VISIT (OUTPATIENT)
Dept: LAB | Facility: HOSPITAL | Age: 65
End: 2025-08-29
Attending: INTERNAL MEDICINE
Payer: COMMERCIAL

## 2025-08-29 ENCOUNTER — E-CONSULT (OUTPATIENT)
Dept: HEPATOLOGY | Facility: CLINIC | Age: 65
End: 2025-08-29
Payer: COMMERCIAL

## 2025-08-29 DIAGNOSIS — E83.19 IRON OVERLOAD: Primary | ICD-10-CM

## 2025-08-29 DIAGNOSIS — D69.6 THROMBOCYTOPENIA: ICD-10-CM

## 2025-08-29 DIAGNOSIS — E83.19 IRON OVERLOAD: ICD-10-CM

## 2025-08-29 DIAGNOSIS — E78.1 HYPERTRIGLYCERIDEMIA: Primary | ICD-10-CM

## 2025-08-29 DIAGNOSIS — K76.0 FATTY LIVER: Primary | ICD-10-CM

## 2025-08-29 PROCEDURE — 36415 COLL VENOUS BLD VENIPUNCTURE: CPT | Mod: PO

## 2025-08-29 PROCEDURE — 81256 HFE GENE: CPT

## 2025-09-05 LAB
GENETIC VARIANT DETAILS BLD/T: NORMAL
GENETICIST REVIEW: NORMAL
LAB TEST METHOD: NORMAL
MOL DX INTERP BLD/T QL: NORMAL
PROVIDER SIGNING NAME: NORMAL
SPECIMEN SOURCE: NORMAL

## 2025-09-06 PROBLEM — Z14.8 HEMOCHROMATOSIS CARRIER: Status: ACTIVE | Noted: 2025-09-06

## (undated) DEVICE — GLOVE 7.5 PROTEXIS PI MICRO

## (undated) DEVICE — TRAY NERVE BLOCK

## (undated) DEVICE — APPLICATOR CHLORAPREP CLR 10.5

## (undated) DEVICE — Device

## (undated) DEVICE — SOL SOD CHLORIDE 0.9% 10ML